# Patient Record
Sex: FEMALE | Race: WHITE | NOT HISPANIC OR LATINO | Employment: FULL TIME | URBAN - METROPOLITAN AREA
[De-identification: names, ages, dates, MRNs, and addresses within clinical notes are randomized per-mention and may not be internally consistent; named-entity substitution may affect disease eponyms.]

---

## 2017-01-12 ENCOUNTER — OFFICE VISIT (OUTPATIENT)
Dept: RETAIL CLINIC | Facility: CLINIC | Age: 22
End: 2017-01-12

## 2017-01-12 VITALS
TEMPERATURE: 98.4 F | HEART RATE: 98 BPM | RESPIRATION RATE: 18 BRPM | DIASTOLIC BLOOD PRESSURE: 90 MMHG | OXYGEN SATURATION: 98 % | SYSTOLIC BLOOD PRESSURE: 130 MMHG

## 2017-01-12 DIAGNOSIS — J40 BRONCHITIS: Primary | ICD-10-CM

## 2017-01-12 PROCEDURE — 99213 OFFICE O/P EST LOW 20 MIN: CPT | Performed by: NURSE PRACTITIONER

## 2017-01-12 RX ORDER — PREDNISONE 10 MG/1
TABLET ORAL
Qty: 21 TABLET | Refills: 0 | OUTPATIENT
Start: 2017-01-12 | End: 2021-12-18

## 2017-01-12 RX ORDER — ALBUTEROL SULFATE 90 UG/1
2 AEROSOL, METERED RESPIRATORY (INHALATION) EVERY 4 HOURS PRN
Qty: 1 INHALER | Refills: 0 | OUTPATIENT
Start: 2017-01-12 | End: 2021-12-18

## 2017-01-12 RX ORDER — BROMPHENIRAMINE MALEATE, PSEUDOEPHEDRINE HYDROCHLORIDE, AND DEXTROMETHORPHAN HYDROBROMIDE 2; 30; 10 MG/5ML; MG/5ML; MG/5ML
5 SYRUP ORAL 4 TIMES DAILY PRN
Qty: 250 ML | Refills: 0 | OUTPATIENT
Start: 2017-01-12 | End: 2021-12-18

## 2017-01-12 NOTE — PROGRESS NOTES
Subjective   Renea Infante is a 21 y.o. female.     Cough   This is a recurrent problem. The current episode started in the past 7 days. The problem has been gradually worsening. The cough is productive of sputum. Associated symptoms include headaches, nasal congestion and wheezing. Pertinent negatives include no fever, postnasal drip or sore throat. Associated symptoms comments: Chest congestion. Risk factors for lung disease include smoking/tobacco exposure. She has tried OTC cough suppressant for the symptoms. The treatment provided mild relief. Her past medical history is significant for bronchitis and environmental allergies.   URI    This is a recurrent problem. The current episode started in the past 7 days. There has been no fever. Associated symptoms include congestion, coughing, headaches and wheezing. Pertinent negatives include no sore throat. She has tried decongestant for the symptoms. The treatment provided mild relief.        The following portions of the patient's history were reviewed and updated as appropriate: allergies, current medications, past family history, past medical history, past social history, past surgical history and problem list.    Review of Systems   Constitutional: Negative for fever.   HENT: Positive for congestion and voice change. Negative for postnasal drip and sore throat.    Eyes: Negative.    Respiratory: Positive for cough, chest tightness and wheezing.    Cardiovascular: Negative.    Allergic/Immunologic: Positive for environmental allergies.   Neurological: Positive for headaches.       Objective   Physical Exam   Constitutional: She is oriented to person, place, and time.   HENT:   Head: Atraumatic.   Right Ear: Tympanic membrane and external ear normal.   Left Ear: Tympanic membrane and external ear normal.   Mouth/Throat: No oropharyngeal exudate, posterior oropharyngeal edema or posterior oropharyngeal erythema.   Cardiovascular: Normal rate and regular rhythm.     Pulmonary/Chest: Effort normal. She has no decreased breath sounds. She has wheezes in the right upper field and the left upper field. She has rhonchi in the right upper field, the right middle field, the left upper field and the left middle field. She has no rales.   Chest congestion   Abdominal: Soft. Bowel sounds are normal.   Neurological: She is oriented to person, place, and time.   Nursing note and vitals reviewed.      Assessment/Plan   Renea was seen today for cough, fatigue and uri.    Diagnoses and all orders for this visit:    Bronchitis  -     predniSONE (DELTASONE) 10 MG tablet; 10 mg pack with package instructions  -     brompheniramine-pseudoephedrine-DM 30-2-10 MG/5ML syrup; Take 5 mL by mouth 4 (Four) Times a Day As Needed for allergies.  -     albuterol (PROVENTIL HFA) 108 (90 BASE) MCG/ACT inhaler; Inhale 2 puffs Every 4 (Four) Hours As Needed for wheezing or shortness of air.    Talked to the patient about the diagnosis and educate the patient and advise to visit to PCP if the symptoms worsens

## 2017-01-12 NOTE — PATIENT INSTRUCTIONS
Acute Bronchitis  Bronchitis is inflammation of the airways that extend from the windpipe into the lungs (bronchi). The inflammation often causes mucus to develop. This leads to a cough, which is the most common symptom of bronchitis.   In acute bronchitis, the condition usually develops suddenly and goes away over time, usually in a couple weeks. Smoking, allergies, and asthma can make bronchitis worse. Repeated episodes of bronchitis may cause further lung problems.   CAUSES  Acute bronchitis is most often caused by the same virus that causes a cold. The virus can spread from person to person (contagious) through coughing, sneezing, and touching contaminated objects.  SIGNS AND SYMPTOMS   · Cough.    · Fever.    · Coughing up mucus.    · Body aches.    · Chest congestion.    · Chills.    · Shortness of breath.    · Sore throat.    DIAGNOSIS   Acute bronchitis is usually diagnosed through a physical exam. Your health care provider will also ask you questions about your medical history. Tests, such as chest X-rays, are sometimes done to rule out other conditions.   TREATMENT   Acute bronchitis usually goes away in a couple weeks. Oftentimes, no medical treatment is necessary. Medicines are sometimes given for relief of fever or cough. Antibiotic medicines are usually not needed but may be prescribed in certain situations. In some cases, an inhaler may be recommended to help reduce shortness of breath and control the cough. A cool mist vaporizer may also be used to help thin bronchial secretions and make it easier to clear the chest.   HOME CARE INSTRUCTIONS  · Get plenty of rest.    · Drink enough fluids to keep your urine clear or pale yellow (unless you have a medical condition that requires fluid restriction). Increasing fluids may help thin your respiratory secretions (sputum) and reduce chest congestion, and it will prevent dehydration.    · Take medicines only as directed by your health care provider.  · If  you were prescribed an antibiotic medicine, finish it all even if you start to feel better.  · Avoid smoking and secondhand smoke. Exposure to cigarette smoke or irritating chemicals will make bronchitis worse. If you are a smoker, consider using nicotine gum or skin patches to help control withdrawal symptoms. Quitting smoking will help your lungs heal faster.    · Reduce the chances of another bout of acute bronchitis by washing your hands frequently, avoiding people with cold symptoms, and trying not to touch your hands to your mouth, nose, or eyes.    · Keep all follow-up visits as directed by your health care provider.    SEEK MEDICAL CARE IF:  Your symptoms do not improve after 1 week of treatment.   SEEK IMMEDIATE MEDICAL CARE IF:  · You develop an increased fever or chills.    · You have chest pain.    · You have severe shortness of breath.  · You have bloody sputum.    · You develop dehydration.  · You faint or repeatedly feel like you are going to pass out.  · You develop repeated vomiting.  · You develop a severe headache.  MAKE SURE YOU:   · Understand these instructions.  · Will watch your condition.  · Will get help right away if you are not doing well or get worse.     This information is not intended to replace advice given to you by your health care provider. Make sure you discuss any questions you have with your health care provider.     Document Released: 01/25/2006 Document Revised: 01/08/2016 Document Reviewed: 06/10/2014  [employer] Interactive Patient Education ©2016 [employer]  Talked to the patient about the diagnosis and educate the patient and advise to visit to PCP if the symptoms worsens

## 2017-01-12 NOTE — MR AVS SNAPSHOT
Rneea Naresh   1/12/2017 6:30 PM   Office Visit    Dept Phone:  [phone]   Encounter #:  [id number]    Provider:  MILAGRO FAULKNER   Department:  [facility]                Your Full Care Plan              Today's Medication Changes          These changes are accurate as of: 1/12/17  6:46 PM.  If you have any questions, ask your nurse or doctor.               New Medication(s)Ordered:     albuterol 108 (90 BASE) MCG/ACT inhaler   Commonly known as:  PROVENTIL HFA   Inhale 2 puffs Every 4 (Four) Hours As Needed for wheezing or shortness of air.       brompheniramine-pseudoephedrine-DM 30-2-10 MG/5ML syrup   Take 5 mL by mouth 4 (Four) Times a Day As Needed for allergies.       predniSONE 10 MG tablet   Commonly known as:  DELTASONE   10 mg pack with package instructions            Where to Get Your Medications      These medications were sent to [facility] [address] [place] [address] [id number] AT [facility] [facility] - [phone]  - [phone]   [address], [facility] [address]    Hours:  24-hours Phone:  [phone]     albuterol 108 (90 BASE) MCG/ACT inhaler    brompheniramine-pseudoephedrine-DM 30-2-10 MG/5ML syrup    predniSONE 10 MG tablet                  Your Updated Medication List          This list is accurate as of: 1/12/17  6:46 PM.  Always use your most recent med list.                albuterol 108 (90 BASE) MCG/ACT inhaler   Commonly known as:  PROVENTIL HFA   Inhale 2 puffs Every 4 (Four) Hours As Needed for wheezing or shortness of air.       brompheniramine-pseudoephedrine-DM 30-2-10 MG/5ML syrup   Take 5 mL by mouth 4 (Four) Times a Day As Needed for allergies.       MUCINEX DM MAXIMUM STRENGTH PO       ondansetron 4 MG tablet   Commonly known as:  ZOFRAN   Take 1 tablet by mouth Every 6 (Six) Hours As Needed for nausea or vomiting.       predniSONE 10 MG tablet   Commonly known as:   DELTASONE   10 mg pack with package instructions               You Were Diagnosed With        Codes Comments    Bronchitis    -  Primary ICD-10-CM: J40  ICD-9-CM: 490       Instructions    Acute Bronchitis  Bronchitis is inflammation of the airways that extend from the windpipe into the lungs (bronchi). The inflammation often causes mucus to develop. This leads to a cough, which is the most common symptom of bronchitis.   In acute bronchitis, the condition usually develops suddenly and goes away over time, usually in a couple weeks. Smoking, allergies, and asthma can make bronchitis worse. Repeated episodes of bronchitis may cause further lung problems.   CAUSES  Acute bronchitis is most often caused by the same virus that causes a cold. The virus can spread from person to person (contagious) through coughing, sneezing, and touching contaminated objects.  SIGNS AND SYMPTOMS   · Cough.    · Fever.    · Coughing up mucus.    · Body aches.    · Chest congestion.    · Chills.    · Shortness of breath.    · Sore throat.    DIAGNOSIS   Acute bronchitis is usually diagnosed through a physical exam. Your health care provider will also ask you questions about your medical history. Tests, such as chest X-rays, are sometimes done to rule out other conditions.   TREATMENT   Acute bronchitis usually goes away in a couple weeks. Oftentimes, no medical treatment is necessary. Medicines are sometimes given for relief of fever or cough. Antibiotic medicines are usually not needed but may be prescribed in certain situations. In some cases, an inhaler may be recommended to help reduce shortness of breath and control the cough. A cool mist vaporizer may also be used to help thin bronchial secretions and make it easier to clear the chest.   HOME CARE INSTRUCTIONS  · Get plenty of rest.    · Drink enough fluids to keep your urine clear or pale yellow (unless you have a medical condition that requires fluid restriction). Increasing fluids  may help thin your respiratory secretions (sputum) and reduce chest congestion, and it will prevent dehydration.    · Take medicines only as directed by your health care provider.  · If you were prescribed an antibiotic medicine, finish it all even if you start to feel better.  · Avoid smoking and secondhand smoke. Exposure to cigarette smoke or irritating chemicals will make bronchitis worse. If you are a smoker, consider using nicotine gum or skin patches to help control withdrawal symptoms. Quitting smoking will help your lungs heal faster.    · Reduce the chances of another bout of acute bronchitis by washing your hands frequently, avoiding people with cold symptoms, and trying not to touch your hands to your mouth, nose, or eyes.    · Keep all follow-up visits as directed by your health care provider.    SEEK MEDICAL CARE IF:  Your symptoms do not improve after 1 week of treatment.   SEEK IMMEDIATE MEDICAL CARE IF:  · You develop an increased fever or chills.    · You have chest pain.    · You have severe shortness of breath.  · You have bloody sputum.    · You develop dehydration.  · You faint or repeatedly feel like you are going to pass out.  · You develop repeated vomiting.  · You develop a severe headache.  MAKE SURE YOU:   · Understand these instructions.  · Will watch your condition.  · Will get help right away if you are not doing well or get worse.     This information is not intended to replace advice given to you by your health care provider. Make sure you discuss any questions you have with your health care provider.     Document Released: 01/25/2006 Document Revised: 01/08/2016 Document Reviewed: 06/10/2014  [employer] Interactive Patient Education ©2016 [employer]       Patient Instructions History      Upcoming Appointments     Visit Type Date Time Department    OFFICE VISIT 1/12/2017  6:30 PM [facility]      [employer] Signup     [facility] [employer] allows you to send messages to your  doctor, view your test results, renew your prescriptions, schedule appointments, and more. To sign up, go to [email or web address] and click on the Sign Up Now link in the New User? box. Enter your Shake Activation Code exactly as it appears below along with the last four digits of your Social Security Number and your Date of Birth () to complete the sign-up process. If you do not sign up before the expiration date, you must request a new code.    Shake Activation Code: [email or web address]  Expires: 2017  6:45 PM    If you have questions, you can email [email or web address] or call [phone] to talk to our Shake staff. Remember, Shake is NOT to be used for urgent needs. For medical emergencies, dial 911.               Other Info from Your Visit           Allergies     Penicillins        Reason for Visit     Cough productive with green phlegm x 2 wks getting worse    Fatigue           Vital Signs     Blood Pressure Pulse Temperature Respirations Oxygen Saturation       130/90 98 98.4 °F (36.9 °C) (Oral) 18 98%     Smoking Status                   Former Smoker           Problems and Diagnoses Noted     Bronchitis    -  Primary

## 2017-01-13 PROBLEM — R09.89 CHEST CONGESTION: Status: ACTIVE | Noted: 2017-01-13

## 2017-01-13 PROBLEM — R05.9 COUGH: Status: ACTIVE | Noted: 2017-01-13

## 2017-06-27 ENCOUNTER — HOSPITAL ENCOUNTER (EMERGENCY)
Facility: HOSPITAL | Age: 22
Discharge: HOME | End: 2017-06-27
Attending: EMERGENCY MEDICINE | Admitting: EMERGENCY MEDICINE

## 2017-06-27 VITALS
HEIGHT: 69 IN | SYSTOLIC BLOOD PRESSURE: 135 MMHG | BODY MASS INDEX: 34.07 KG/M2 | HEART RATE: 96 BPM | DIASTOLIC BLOOD PRESSURE: 89 MMHG | WEIGHT: 230 LBS | RESPIRATION RATE: 16 BRPM | TEMPERATURE: 99.3 F | OXYGEN SATURATION: 97 %

## 2017-06-27 DIAGNOSIS — M54.41 ACUTE MIDLINE LOW BACK PAIN WITH BILATERAL SCIATICA: Primary | ICD-10-CM

## 2017-06-27 DIAGNOSIS — M54.42 ACUTE MIDLINE LOW BACK PAIN WITH BILATERAL SCIATICA: Primary | ICD-10-CM

## 2017-06-27 PROCEDURE — 99283 EMERGENCY DEPT VISIT LOW MDM: CPT

## 2017-06-27 PROCEDURE — 63710000001 PREDNISONE PER 1 MG: Performed by: PHYSICIAN ASSISTANT

## 2017-06-27 RX ORDER — DICLOFENAC SODIUM 75 MG/1
75 TABLET, DELAYED RELEASE ORAL 2 TIMES DAILY
Qty: 20 TABLET | Refills: 0 | OUTPATIENT
Start: 2017-06-27 | End: 2021-12-18

## 2017-06-27 RX ORDER — HYDROCODONE BITARTRATE AND ACETAMINOPHEN 7.5; 325 MG/1; MG/1
1 TABLET ORAL ONCE
Status: COMPLETED | OUTPATIENT
Start: 2017-06-27 | End: 2017-06-27

## 2017-06-27 RX ORDER — PREDNISONE 20 MG/1
60 TABLET ORAL ONCE
Status: COMPLETED | OUTPATIENT
Start: 2017-06-27 | End: 2017-06-27

## 2017-06-27 RX ORDER — METHOCARBAMOL 500 MG/1
1000 TABLET, FILM COATED ORAL 4 TIMES DAILY
Qty: 40 TABLET | Refills: 0 | OUTPATIENT
Start: 2017-06-27 | End: 2021-12-18

## 2017-06-27 RX ADMIN — HYDROCODONE BITARTRATE AND ACETAMINOPHEN 1 TABLET: 7.5; 325 TABLET ORAL at 21:08

## 2017-06-27 RX ADMIN — PREDNISONE 60 MG: 20 TABLET ORAL at 21:09

## 2018-04-03 ENCOUNTER — APPOINTMENT (OUTPATIENT)
Dept: CT IMAGING | Facility: HOSPITAL | Age: 23
End: 2018-04-03

## 2018-04-03 ENCOUNTER — HOSPITAL ENCOUNTER (EMERGENCY)
Facility: HOSPITAL | Age: 23
Discharge: HOME | End: 2018-04-03
Attending: FAMILY MEDICINE | Admitting: FAMILY MEDICINE

## 2018-04-03 VITALS
HEIGHT: 69 IN | SYSTOLIC BLOOD PRESSURE: 155 MMHG | HEART RATE: 100 BPM | RESPIRATION RATE: 20 BRPM | DIASTOLIC BLOOD PRESSURE: 88 MMHG | OXYGEN SATURATION: 86 % | BODY MASS INDEX: 37.03 KG/M2 | TEMPERATURE: 96.9 F | WEIGHT: 250 LBS

## 2018-04-03 DIAGNOSIS — G43.109 MIGRAINE WITH AURA AND WITHOUT STATUS MIGRAINOSUS, NOT INTRACTABLE: Primary | ICD-10-CM

## 2018-04-03 PROCEDURE — 96374 THER/PROPH/DIAG INJ IV PUSH: CPT

## 2018-04-03 PROCEDURE — 25010000002 PROCHLORPERAZINE EDISYLATE PER 10 MG: Performed by: FAMILY MEDICINE

## 2018-04-03 PROCEDURE — 70450 CT HEAD/BRAIN W/O DYE: CPT

## 2018-04-03 PROCEDURE — 99284 EMERGENCY DEPT VISIT MOD MDM: CPT

## 2018-04-03 RX ADMIN — SODIUM CHLORIDE 1000 ML: 9 INJECTION, SOLUTION INTRAVENOUS at 20:17

## 2018-04-03 RX ADMIN — PROCHLORPERAZINE EDISYLATE 10 MG: 5 INJECTION INTRAMUSCULAR; INTRAVENOUS at 20:17

## 2018-04-03 NOTE — ED NOTES
"Patient tearful upon entering room, stating, \"I think I'm having a stroke.\" Patient describes headache that started at 1145 today as a tingling pressure, radiating from occipital lobe to parietal area, sometimes on the left and others on the right. Patient is seeing black spots. Denies photophobia. Patient nauseated and is dry-heaving during assessment. Patient tachycardic and sweating. Patient on monitor, IV in place, blood drawn and tubes placed at bedside, emesis bag provided, call light in lap, patient awaiting MD.     Herminia Diggs RN  04/03/18 1917    "

## 2018-04-03 NOTE — ED TRIAGE NOTES
Pt c/o headache starting at 11am today. Pt states that she took ibuprofen and attempted to take a nap was unable to sleep. Pt states that she feels shaky, sees black speckles intermittently and feels sweaty and has a dry mouth. Pt states that pain starts at the back of her head and moves to the top.

## 2018-04-03 NOTE — ED PROVIDER NOTES
EMERGENCY DEPARTMENT ENCOUNTER    CHIEF COMPLAINT  Chief Complaint: HA  History given by: patient  History limited by: nothing  Room Number: 48/48  PMD: No Known Provider      HPI:  Pt is a 22 y.o. female who presents complaining of a headache onset 1145 today. The pain was sudden in the back of her head followed by diaphoresis and nausea. She began to feel weak and dizzy, then the pain radiated anteriorly. She reports her throat dried up as well as peripheral flashing lights in her vision. She denies vomiting, diarrhea, and bowel issues. She took ASA and fell asleep which slightly improved her sx, but when she stood up her sx worsened again. She has a hx of migraines and this is not the worst of them, but she feels this HA is different. Her usual migraines may be in the same distribution and with scintillating scotomata like she had today but she is not photophobic today like she usually is.  She has a hx of ocular floaters and she also had some today.  Presently her vision is fine..    Duration:  today  Onset: sudden  Timing: constant  Location: back of head  Radiation: anterior head  Quality: sharp  Intensity/Severity: moderate  Progression: worsened  Associated Symptoms: nausea, weakness, dizziness, dry throat, diaphoress  Aggravating Factors: none  Alleviating Factors: ASA (slightly)  Previous Episodes: She has a hx of migraines, but this feels different.  Treatment before arrival: She took ASA prior to arrival.    PAST MEDICAL HISTORY  Active Ambulatory Problems     Diagnosis Date Noted   • Cough 2017   • Chest congestion 2017     Resolved Ambulatory Problems     Diagnosis Date Noted   • No Resolved Ambulatory Problems     Past Medical History:   Diagnosis Date   • Hypoglycemia    • Spina bifida occulta        PAST SURGICAL HISTORY  Past Surgical History:   Procedure Laterality Date   •  SECTION     • CYST REMOVAL     • RHINOPLASTY         FAMILY HISTORY  Family History   Problem Relation  Age of Onset   • Heart disease Maternal Grandmother    • Cancer Paternal Grandmother    • Diabetes Paternal Grandmother        SOCIAL HISTORY  Social History     Social History   • Marital status: Single     Spouse name: N/A   • Number of children: N/A   • Years of education: N/A     Occupational History   • Not on file.     Social History Main Topics   • Smoking status: Former Smoker   • Smokeless tobacco: Former User     Quit date: 2015   • Alcohol use Yes      Comment: rarely   • Drug use: No   • Sexual activity: Not on file     Other Topics Concern   • Not on file     Social History Narrative   • No narrative on file       ALLERGIES  Penicillins    REVIEW OF SYSTEMS  Review of Systems   Constitutional: Positive for diaphoresis. Negative for fever.   HENT: Negative for sore throat.         Dry throat   Eyes: Negative.    Respiratory: Negative for cough and shortness of breath.    Cardiovascular: Negative for chest pain.   Gastrointestinal: Positive for nausea. Negative for abdominal pain, diarrhea and vomiting.   Genitourinary: Negative for dysuria.   Musculoskeletal: Negative for neck pain.   Skin: Negative for rash.   Allergic/Immunologic: Negative.    Neurological: Positive for dizziness, weakness and headaches. Negative for numbness.   Hematological: Negative.    Psychiatric/Behavioral: Negative.    All other systems reviewed and are negative.      PHYSICAL EXAM  ED Triage Vitals   Temp Heart Rate Resp BP SpO2   04/03/18 1842 04/03/18 1842 04/03/18 1842 04/03/18 1854 04/03/18 1842   96.9 °F (36.1 °C) (!) 138 20 (!) 182/125 99 %      Temp src Heart Rate Source Patient Position BP Location FiO2 (%)   04/03/18 1842 04/03/18 1842 04/03/18 1854 04/03/18 1854 --   Tympanic Monitor Sitting Right arm        Physical Exam   Constitutional: She is oriented to person, place, and time and well-developed, well-nourished, and in no distress. No distress.   HENT:   Head: Normocephalic and atraumatic.   Eyes: EOM are normal.  Pupils are equal, round, and reactive to light.   Normal visual acuity   Neck: Normal range of motion. Neck supple.   Cardiovascular: Normal rate, regular rhythm and normal heart sounds.    Pulmonary/Chest: Effort normal and breath sounds normal. No respiratory distress.   Abdominal: Soft. There is no tenderness. There is no rebound and no guarding.   Musculoskeletal: Normal range of motion. She exhibits no edema.   Neurological: She is alert and oriented to person, place, and time. She has normal sensation and normal strength.   Skin: Skin is warm and dry. No rash noted.   Psychiatric: Mood and affect normal.   Nursing note and vitals reviewed.      RADIOLOGY  CT Head Without Contrast   Preliminary Result   No acute process identified. Further evaluation could be   performed with a MRI examination of brain as indicated.               Radiation dose reduction techniques were utilized, including automated   exposure control and exposure modulation based on body size.                   I ordered the above noted radiological studies. Interpreted by radiologist. Reviewed by me in PACS.       PROCEDURES  Procedures  Interval: baseline  1a. Level of Consciousness: 0-->Alert, keenly responsive  1b. LOC Questions: 0-->Answers both questions correctly  1c. LOC Commands: 0-->Performs both tasks correctly  2. Best Gaze: 0-->Normal  3. Visual: 0-->No visual loss  4. Facial Palsy: 0-->Normal symmetrical movements  5a. Motor Arm, Left: 0-->No drift, limb holds 90 (or 45) degrees for full 10 secs  5b. Motor Arm, Right: 0-->No drift, limb holds 90 (or 45) degrees for full 10 secs  6a. Motor Leg, Left: 0-->No drift, leg holds 30 degree position for full 5 secs  6b. Motor Leg, Right: 0-->No drift, leg holds 30 degree position for full 5 secs  7. Limb Ataxia: 0-->Absent  8. Sensory: 0-->Normal, no sensory loss  9. Best Language: 0-->No aphasia, normal  10. Dysarthria: 0-->Normal  11. Extinction and Inattention (formerly Neglect):  0-->No abnormality    Total (NIH Stroke Scale): 0      PROGRESS AND CONSULTS  ED Course     2005- Initial pt evaluation. We will plan for CT Head to further evaluate the pt. Will f/u with the results.    2115- Pt recheck. Pt is feeling better after IV compazine. I assured her that her CT is likely negative, but we are still waiting for results. Pt will likely be discharged if no acute abnormalities are found. Pt agrees with the plan for discharge.    MEDICAL DECISION MAKING  Results were reviewed/discussed with the patient and they were also made aware of online access. Pt also made aware that some labs, such as cultures, will not be resulted during ER visit and follow up with PMD is necessary.     MDM  Number of Diagnoses or Management Options     Amount and/or Complexity of Data Reviewed  Tests in the radiology section of CPT®: ordered and reviewed           DIAGNOSIS  Final diagnoses:   Migraine with aura and without status migrainosus, not intractable       DISPOSITION  DISCHARGE    Patient discharged in stable condition.    Reviewed implications of results, diagnosis, meds, responsibility to follow up, warning signs and symptoms of possible worsening, potential complications and reasons to return to ER, including worsening of migraine.    Patient/Family voiced understanding of above instructions.    Discussed plan for discharge, as there is no emergent indication for admission. Patient referred to primary care provider for BP management due to today's BP. Pt/family is agreeable and understands need for follow up and repeat testing.  Pt is aware that discharge does not mean that nothing is wrong but it indicates no emergency is present that requires admission and they must continue care with follow-up as given below or physician of their choice.     FOLLOW-UP  Marlon Vazquez V DO  [address]  [facility] [address] [phone]               Medication List      No changes were made to your prescriptions  during this visit.           Latest Documented Vital Signs:  As of 9:24 PM  BP- 155/88 HR- 87 Temp- 96.9 °F (36.1 °C) (Tympanic) O2 sat- 97%    --  Documentation assistance provided by shahnaz Singletary for Dr. Batista.  Information recorded by the scribaram was done at my direction and has been verified and validated by me.     Adeel Singletary  04/03/18 2028       Adeel Singletary  04/03/18 2114       Adeel Singletary  04/03/18 2124       Anders Batista MD  04/03/18 6405

## 2018-04-03 NOTE — ED NOTES
Pt c/o headache with tingling in her head since 1145 this am. Pt also c/o dizziness, weakness, dry mouth, and sweating.      Jina Lew RN  04/03/18 2873

## 2018-04-04 NOTE — ED NOTES
Visual acuity test complete.  Left eye: 20/25  Right eye: 20/25  Both eyes: 20/15    Reported to MD.     Herminia Diggs RN  04/03/18 [id number]

## 2020-09-04 ENCOUNTER — APPOINTMENT (OUTPATIENT)
Dept: CT IMAGING | Facility: HOSPITAL | Age: 25
End: 2020-09-04

## 2020-09-04 ENCOUNTER — HOSPITAL ENCOUNTER (EMERGENCY)
Facility: HOSPITAL | Age: 25
Discharge: HOME | End: 2020-09-05
Attending: EMERGENCY MEDICINE | Admitting: EMERGENCY MEDICINE

## 2020-09-04 DIAGNOSIS — G43.109 MIGRAINE WITH AURA AND WITHOUT STATUS MIGRAINOSUS, NOT INTRACTABLE: Primary | ICD-10-CM

## 2020-09-04 LAB
ALBUMIN SERPL-MCNC: 4.6 G/DL (ref 3.5–5.2)
ALBUMIN/GLOB SERPL: 1.4 G/DL
ALP SERPL-CCNC: 71 U/L (ref 39–117)
ALT SERPL W P-5'-P-CCNC: 32 U/L (ref 1–33)
ANION GAP SERPL CALCULATED.3IONS-SCNC: 12.4 MMOL/L (ref 5–15)
AST SERPL-CCNC: 20 U/L (ref 1–32)
BASOPHILS # BLD AUTO: 0.06 10*3/MM3 (ref 0–0.2)
BASOPHILS NFR BLD AUTO: 0.4 % (ref 0–1.5)
BILIRUB SERPL-MCNC: 0.3 MG/DL (ref 0–1.2)
BUN SERPL-MCNC: 7 MG/DL (ref 6–20)
BUN/CREAT SERPL: 10.3 (ref 7–25)
CALCIUM SPEC-SCNC: 9.8 MG/DL (ref 8.6–10.5)
CHLORIDE SERPL-SCNC: 104 MMOL/L (ref 98–107)
CO2 SERPL-SCNC: 23.6 MMOL/L (ref 22–29)
CREAT SERPL-MCNC: 0.68 MG/DL (ref 0.57–1)
DEPRECATED RDW RBC AUTO: 38.3 FL (ref 37–54)
EOSINOPHIL # BLD AUTO: 0.2 10*3/MM3 (ref 0–0.4)
EOSINOPHIL NFR BLD AUTO: 1.5 % (ref 0.3–6.2)
ERYTHROCYTE [DISTWIDTH] IN BLOOD BY AUTOMATED COUNT: 12.6 % (ref 12.3–15.4)
GFR SERPL CREATININE-BSD FRML MDRD: 104 ML/MIN/1.73
GLOBULIN UR ELPH-MCNC: 3.4 GM/DL
GLUCOSE SERPL-MCNC: 107 MG/DL (ref 65–99)
HCT VFR BLD AUTO: 42.1 % (ref 34–46.6)
HGB BLD-MCNC: 14.4 G/DL (ref 12–15.9)
IMM GRANULOCYTES # BLD AUTO: 0.1 10*3/MM3 (ref 0–0.05)
IMM GRANULOCYTES NFR BLD AUTO: 0.7 % (ref 0–0.5)
LYMPHOCYTES # BLD AUTO: 3.37 10*3/MM3 (ref 0.7–3.1)
LYMPHOCYTES NFR BLD AUTO: 24.7 % (ref 19.6–45.3)
MCH RBC QN AUTO: 28.9 PG (ref 26.6–33)
MCHC RBC AUTO-ENTMCNC: 34.2 G/DL (ref 31.5–35.7)
MCV RBC AUTO: 84.4 FL (ref 79–97)
MONOCYTES # BLD AUTO: 1.04 10*3/MM3 (ref 0.1–0.9)
MONOCYTES NFR BLD AUTO: 7.6 % (ref 5–12)
NEUTROPHILS NFR BLD AUTO: 65.1 % (ref 42.7–76)
NEUTROPHILS NFR BLD AUTO: 8.86 10*3/MM3 (ref 1.7–7)
NRBC BLD AUTO-RTO: 0 /100 WBC (ref 0–0.2)
PLATELET # BLD AUTO: 394 10*3/MM3 (ref 140–450)
PMV BLD AUTO: 8.8 FL (ref 6–12)
POTASSIUM SERPL-SCNC: 3.4 MMOL/L (ref 3.5–5.2)
PROT SERPL-MCNC: 8 G/DL (ref 6–8.5)
RBC # BLD AUTO: 4.99 10*6/MM3 (ref 3.77–5.28)
SODIUM SERPL-SCNC: 140 MMOL/L (ref 136–145)
WBC # BLD AUTO: 13.63 10*3/MM3 (ref 3.4–10.8)

## 2020-09-04 PROCEDURE — 25010000002 ONDANSETRON PER 1 MG: Performed by: EMERGENCY MEDICINE

## 2020-09-04 PROCEDURE — 80053 COMPREHEN METABOLIC PANEL: CPT | Performed by: EMERGENCY MEDICINE

## 2020-09-04 PROCEDURE — 99284 EMERGENCY DEPT VISIT MOD MDM: CPT

## 2020-09-04 PROCEDURE — 96375 TX/PRO/DX INJ NEW DRUG ADDON: CPT

## 2020-09-04 PROCEDURE — 85025 COMPLETE CBC W/AUTO DIFF WBC: CPT | Performed by: EMERGENCY MEDICINE

## 2020-09-04 PROCEDURE — 96374 THER/PROPH/DIAG INJ IV PUSH: CPT

## 2020-09-04 PROCEDURE — 25010000002 PROCHLORPERAZINE 10 MG/2ML SOLUTION: Performed by: EMERGENCY MEDICINE

## 2020-09-04 PROCEDURE — 70450 CT HEAD/BRAIN W/O DYE: CPT

## 2020-09-04 PROCEDURE — 25010000002 HYDROMORPHONE PER 4 MG: Performed by: EMERGENCY MEDICINE

## 2020-09-04 RX ORDER — PROCHLORPERAZINE EDISYLATE 5 MG/ML
5 INJECTION INTRAMUSCULAR; INTRAVENOUS EVERY 6 HOURS PRN
Status: DISCONTINUED | OUTPATIENT
Start: 2020-09-04 | End: 2020-09-05 | Stop reason: HOSPADM

## 2020-09-04 RX ORDER — SODIUM CHLORIDE 0.9 % (FLUSH) 0.9 %
10 SYRINGE (ML) INJECTION AS NEEDED
Status: DISCONTINUED | OUTPATIENT
Start: 2020-09-04 | End: 2020-09-05 | Stop reason: HOSPADM

## 2020-09-04 RX ORDER — HYDROMORPHONE HYDROCHLORIDE 1 MG/ML
0.5 INJECTION, SOLUTION INTRAMUSCULAR; INTRAVENOUS; SUBCUTANEOUS ONCE
Status: COMPLETED | OUTPATIENT
Start: 2020-09-04 | End: 2020-09-04

## 2020-09-04 RX ORDER — ONDANSETRON 2 MG/ML
4 INJECTION INTRAMUSCULAR; INTRAVENOUS ONCE
Status: COMPLETED | OUTPATIENT
Start: 2020-09-04 | End: 2020-09-04

## 2020-09-04 RX ADMIN — ONDANSETRON 4 MG: 2 INJECTION INTRAMUSCULAR; INTRAVENOUS at 22:55

## 2020-09-04 RX ADMIN — HYDROMORPHONE HYDROCHLORIDE 0.5 MG: 1 INJECTION, SOLUTION INTRAMUSCULAR; INTRAVENOUS; SUBCUTANEOUS at 22:55

## 2020-09-04 RX ADMIN — PROCHLORPERAZINE EDISYLATE 5 MG: 5 INJECTION INTRAMUSCULAR; INTRAVENOUS at 22:55

## 2020-09-05 VITALS
OXYGEN SATURATION: 97 % | RESPIRATION RATE: 16 BRPM | TEMPERATURE: 99.6 F | HEART RATE: 60 BPM | BODY MASS INDEX: 38.09 KG/M2 | HEIGHT: 69 IN | DIASTOLIC BLOOD PRESSURE: 82 MMHG | SYSTOLIC BLOOD PRESSURE: 131 MMHG | WEIGHT: 257.2 LBS

## 2020-09-05 RX ORDER — SUMATRIPTAN 50 MG/1
50 TABLET, FILM COATED ORAL
Qty: 9 TABLET | Refills: 0 | OUTPATIENT
Start: 2020-09-05 | End: 2021-12-18

## 2020-09-05 NOTE — ED NOTES
"2 hours ago pt started seeing a light in right eye. Pt took a nap thinking that would help, upon waking up she saw light in both eyes. Pt reports having difficulty \"finding words\". Pt reports left arm \"feels heavy\" and \"feels hot\". Pt c/o left sided \"pressure\" in head. Hx of migraines.         Gracie Sargent, RN  09/04/20 2086    "

## 2020-09-05 NOTE — ED NOTES
Pt informed this RN that she was planning on driving herself home. Pt told that because she received IV dilaudid, per policy she would need to wait before she is cleared to be discharged and drive home.  Pt verbalizes understanding, CN notified.      Gracie Sargent, HALLIE  09/05/20 0037

## 2020-09-05 NOTE — ED PROVIDER NOTES
EMERGENCY DEPARTMENT ENCOUNTER    Room Number:    Date of encounter:  2020  PCP: Savannah Stuart APRN  Historian: Patient      HPI:  Chief Complaint: Headache  A complete HPI/ROS/PMH/PSH/SH/FH are unobtainable due to: Nothing    Context: Renea Infante is a 25 y.o. female who presents to the ED c/o severe left-sided, throbbing headache that began earlier this evening.  Patient said that couple hours prior to arrival she had a very vivid aura in her right eye with lightening bolt streaks in her vision.  This lasted for about an hour, she said the peripheral vision was diminished on that side, but she did not have a headache until about an hour prior to arrival.  She said it began gradually and is built now up to a moderate severity.  Patient also states that she felt heaviness in her left arm during this time.    Patient says she has a history of migraines, but this is a very different aura than what she is used to.  She is had no fever, or other illness prior to today.  She does have nausea but no vomiting at this time.  She has no neck pain      PAST MEDICAL HISTORY  Active Ambulatory Problems     Diagnosis Date Noted   • Cough 2017   • Chest congestion 2017     Resolved Ambulatory Problems     Diagnosis Date Noted   • No Resolved Ambulatory Problems     Past Medical History:   Diagnosis Date   • Hypoglycemia    • Spina bifida occulta          PAST SURGICAL HISTORY  Past Surgical History:   Procedure Laterality Date   •  SECTION     • CYST REMOVAL     • RHINOPLASTY           FAMILY HISTORY  Family History   Problem Relation Age of Onset   • Heart disease Maternal Grandmother    • Cancer Paternal Grandmother    • Diabetes Paternal Grandmother          SOCIAL HISTORY  Social History     Socioeconomic History   • Marital status: Single     Spouse name: Not on file   • Number of children: Not on file   • Years of education: Not on file   • Highest education level: Not on file    Tobacco Use   • Smoking status: Former Smoker   • Smokeless tobacco: Former User     Quit date: 2015   Substance and Sexual Activity   • Alcohol use: Yes     Comment: rarely   • Drug use: No         ALLERGIES  Penicillins        REVIEW OF SYSTEMS  Review of Systems     All systems reviewed and negative except for those discussed in HPI.       PHYSICAL EXAM    I have reviewed the triage vital signs and nursing notes.    ED Triage Vitals   Temp Heart Rate Resp BP SpO2   09/04/20 2242 09/04/20 2235 09/04/20 2235 09/04/20 2237 09/04/20 2235   99.6 °F (37.6 °C) 110 20 (!) 169/110 98 %      Temp src Heart Rate Source Patient Position BP Location FiO2 (%)   09/04/20 2242 -- -- -- --   Oral           Physical Exam  GENERAL: Awake and alert, appears uncomfortable.  Photophobic  HENT: nares patent, no meningismus  EYES: no scleral icterus, Ana, EOMI  CV: Sinus tachycardia  RESPIRATORY: normal effort  ABDOMEN: soft  MUSCULOSKELETAL: no deformity  NEURO: alert, moves all extremities, follows commands, cranial nerves II through XII grossly intact, NIH 0  SKIN: warm, dry        LAB RESULTS  Recent Results (from the past 24 hour(s))   Comprehensive Metabolic Panel    Collection Time: 09/04/20 10:52 PM   Result Value Ref Range    Glucose 107 (H) 65 - 99 mg/dL    BUN 7 6 - 20 mg/dL    Creatinine 0.68 0.57 - 1.00 mg/dL    Sodium 140 136 - 145 mmol/L    Potassium 3.4 (L) 3.5 - 5.2 mmol/L    Chloride 104 98 - 107 mmol/L    CO2 23.6 22.0 - 29.0 mmol/L    Calcium 9.8 8.6 - 10.5 mg/dL    Total Protein 8.0 6.0 - 8.5 g/dL    Albumin 4.60 3.50 - 5.20 g/dL    ALT (SGPT) 32 1 - 33 U/L    AST (SGOT) 20 1 - 32 U/L    Alkaline Phosphatase 71 39 - 117 U/L    Total Bilirubin 0.3 0.0 - 1.2 mg/dL    eGFR Non African Amer 104 >60 mL/min/1.73    Globulin 3.4 gm/dL    A/G Ratio 1.4 g/dL    BUN/Creatinine Ratio 10.3 7.0 - 25.0    Anion Gap 12.4 5.0 - 15.0 mmol/L   CBC Auto Differential    Collection Time: 09/04/20 10:52 PM   Result Value Ref Range     WBC 13.63 (H) 3.40 - 10.80 10*3/mm3    RBC 4.99 3.77 - 5.28 10*6/mm3    Hemoglobin 14.4 12.0 - 15.9 g/dL    Hematocrit 42.1 34.0 - 46.6 %    MCV 84.4 79.0 - 97.0 fL    MCH 28.9 26.6 - 33.0 pg    MCHC 34.2 31.5 - 35.7 g/dL    RDW 12.6 12.3 - 15.4 %    RDW-SD 38.3 37.0 - 54.0 fl    MPV 8.8 6.0 - 12.0 fL    Platelets 394 140 - 450 10*3/mm3    Neutrophil % 65.1 42.7 - 76.0 %    Lymphocyte % 24.7 19.6 - 45.3 %    Monocyte % 7.6 5.0 - 12.0 %    Eosinophil % 1.5 0.3 - 6.2 %    Basophil % 0.4 0.0 - 1.5 %    Immature Grans % 0.7 (H) 0.0 - 0.5 %    Neutrophils, Absolute 8.86 (H) 1.70 - 7.00 10*3/mm3    Lymphocytes, Absolute 3.37 (H) 0.70 - 3.10 10*3/mm3    Monocytes, Absolute 1.04 (H) 0.10 - 0.90 10*3/mm3    Eosinophils, Absolute 0.20 0.00 - 0.40 10*3/mm3    Basophils, Absolute 0.06 0.00 - 0.20 10*3/mm3    Immature Grans, Absolute 0.10 (H) 0.00 - 0.05 10*3/mm3    nRBC 0.0 0.0 - 0.2 /100 WBC       Ordered the above labs and independently reviewed the results.        RADIOLOGY  Ct Head Without Contrast    Result Date: 9/5/2020  CT HEAD WITHOUT CONTRAST  HISTORY: Light in the right thigh starting 2 hours ago. Difficulty finding words.  COMPARISON: 04/03/2018  TECHNIQUE: Axial CT imaging was obtained through the brain. No IV contrast was administered.  FINDINGS: No acute intracranial hemorrhage is seen. Ventricles are normal in size. There is no midline shift or mass effect. Mild mucosal thickening is seen within the maxillary sinuses.      No acute intracranial findings.  Radiation dose reduction techniques were utilized, including automated exposure control and exposure modulation based on body size.  This report was finalized on 9/5/2020 12:35 AM by Dr. Deirdre Shin M.D.        I ordered the above noted radiological studies. Reviewed by me and discussed with radiologist.  See dictation for official radiology interpretation.      PROCEDURES    Procedures      MEDICATIONS GIVEN IN ER    Medications   HYDROmorphone  (DILAUDID) injection 0.5 mg (0.5 mg Intravenous Given 9/4/20 2255)   ondansetron (ZOFRAN) injection 4 mg (4 mg Intravenous Given 9/4/20 2255)         PROGRESS, DATA ANALYSIS, CONSULTS, AND MEDICAL DECISION MAKING    All labs have been independently reviewed by me.  All radiology studies have been reviewed by me and discussed with radiologist dictating the report.   EKG's independently viewed and interpreted by me.  Discussion below represents my analysis of pertinent findings related to patient's condition, differential diagnosis, treatment plan and final disposition.        ED Course as of Sep 05 0505   Sat Sep 05, 2020   0504 Patient received a migraine cocktail with Dilaudid, Zofran and Compazine.    [DP]   0504 CBC showed a mild leukocytosis, chemistry unremarkable    [DP]   0504 CT scan of the head show no acute disease    [DP]   0504 About an hour later, she was resting comfortably.  She said the pain was almost completely gone, all her visual complaints were completely resolved and she felt no more heaviness in the left arm.  Her exam is completely normalized, and her blood pressure came down on its own with just migraine treatment.    [DP]   0505 This sounds like pretty typical migraine with aura along with some neuro complaints    [DP]      ED Course User Index  [DP] Mitchell Thibodeaux MD           PPE: Both the patient and I wore a surgical mask throughout the entire patient encounter. I wore protective goggles.     AS OF 05:05 VITALS:    BP - 131/82  HR - 60  TEMP - 99.6 °F (37.6 °C) (Oral)  O2 SATS - 97%        DIAGNOSIS  Final diagnoses:   Migraine with aura and without status migrainosus, not intractable         DISPOSITION  Discharge           Mitchell Thibodeaux MD  09/05/20 0509

## 2021-08-24 ENCOUNTER — IMMUNIZATION (OUTPATIENT)
Dept: VACCINE CLINIC | Facility: HOSPITAL | Age: 26
End: 2021-08-24

## 2021-08-24 PROCEDURE — 0001A: CPT | Performed by: INTERNAL MEDICINE

## 2021-08-24 PROCEDURE — 91300 HC SARSCOV02 VAC 30MCG/0.3ML IM: CPT | Performed by: INTERNAL MEDICINE

## 2021-10-29 ENCOUNTER — IMMUNIZATION (OUTPATIENT)
Dept: VACCINE CLINIC | Facility: HOSPITAL | Age: 26
End: 2021-10-29

## 2021-10-29 PROCEDURE — 0002A: CPT | Performed by: INTERNAL MEDICINE

## 2021-10-29 PROCEDURE — 91300 HC SARSCOV02 VAC 30MCG/0.3ML IM: CPT | Performed by: INTERNAL MEDICINE

## 2021-12-18 ENCOUNTER — HOSPITAL ENCOUNTER (EMERGENCY)
Facility: HOSPITAL | Age: 26
Discharge: HOME | End: 2021-12-18
Attending: EMERGENCY MEDICINE | Admitting: EMERGENCY MEDICINE

## 2021-12-18 VITALS
TEMPERATURE: 98.9 F | RESPIRATION RATE: 16 BRPM | SYSTOLIC BLOOD PRESSURE: 168 MMHG | WEIGHT: 260 LBS | DIASTOLIC BLOOD PRESSURE: 108 MMHG | HEIGHT: 69 IN | HEART RATE: 88 BPM | BODY MASS INDEX: 38.51 KG/M2 | OXYGEN SATURATION: 98 %

## 2021-12-18 DIAGNOSIS — H66.003 NON-RECURRENT ACUTE SUPPURATIVE OTITIS MEDIA OF BOTH EARS WITHOUT SPONTANEOUS RUPTURE OF TYMPANIC MEMBRANES: Primary | ICD-10-CM

## 2021-12-18 DIAGNOSIS — J02.9 PHARYNGITIS, UNSPECIFIED ETIOLOGY: ICD-10-CM

## 2021-12-18 PROCEDURE — 0202U NFCT DS 22 TRGT SARS-COV-2: CPT | Performed by: PHYSICIAN ASSISTANT

## 2021-12-18 PROCEDURE — 99283 EMERGENCY DEPT VISIT LOW MDM: CPT

## 2021-12-18 RX ORDER — AZITHROMYCIN 250 MG/1
TABLET, FILM COATED ORAL
Qty: 6 TABLET | Refills: 0 | Status: SHIPPED | OUTPATIENT
Start: 2021-12-18 | End: 2022-10-11

## 2021-12-18 RX ORDER — PREDNISONE 20 MG/1
20 TABLET ORAL 2 TIMES DAILY
Qty: 10 TABLET | Refills: 0 | Status: SHIPPED | OUTPATIENT
Start: 2021-12-18 | End: 2022-10-11

## 2021-12-18 NOTE — ED NOTES
Pt ambulatory to triage from home with c/o bilateral ear pain (3 days) and sore throat x 2 weeks.  Pt positive for covid on 12/5/2021.  Pt wearing mask in triage.  Triage personnel wore appropriate PPE       Maria Zuniga RN  12/18/21 5783

## 2021-12-18 NOTE — ED PROVIDER NOTES
EMERGENCY DEPARTMENT ENCOUNTER    Room Number:    Date of encounter:  2021  PCP: Savannah Stuart APRN  Historian: Patient      I used full protective equipment while examining this patient.  This includes face mask, gloves and protective eyewear.  I washed my hands before entering the room and immediately upon leaving the room      HPI:  Chief Complaint: URI symptoms  A complete HPI/ROS/PMH/PSH/SH/FH are unobtainable due to: Nothing    Context: Renea Infante is a 26 y.o. female who presents to the ED c/o URI symptoms.  Patient states she was diagnosed with Covid in October.  Her symptoms from this gradually improved.  Over the past 2 weeks patient has had a sore throat.  Over the past 3 days she describes bilateral ear pain pressure.  She denies any fevers or chills.  She states her sore throat is worse with swallowing.  Patient reports being seen at [facility] 2 weeks ago.  She states her Covid test at that time was still positive.  She denies any cough or shortness of breath.    Review of Medical Records  I reviewed urgent care office visit from 2021.  Patient had a positive COVID-19 test at that time.    PAST MEDICAL HISTORY  Active Ambulatory Problems     Diagnosis Date Noted   • Cough 2017   • Chest congestion 2017     Resolved Ambulatory Problems     Diagnosis Date Noted   • No Resolved Ambulatory Problems     Past Medical History:   Diagnosis Date   • Hypoglycemia    • Spina bifida occulta          PAST SURGICAL HISTORY  Past Surgical History:   Procedure Laterality Date   •  SECTION     • CYST REMOVAL     • RHINOPLASTY           FAMILY HISTORY  Family History   Problem Relation Age of Onset   • Heart disease Maternal Grandmother    • Cancer Paternal Grandmother    • Diabetes Paternal Grandmother          SOCIAL HISTORY  Social History     Socioeconomic History   • Marital status: Single   Tobacco Use   • Smoking status: Former Smoker   • Smokeless tobacco:  Former User     Quit date: 2015   Substance and Sexual Activity   • Alcohol use: Yes     Comment: rarely   • Drug use: No         ALLERGIES  Penicillins        REVIEW OF SYSTEMS  All systems reviewed and negative except for those discussed in HPI.       PHYSICAL EXAM    I have reviewed the triage vital signs and nursing notes.    ED Triage Vitals   Temp Heart Rate Resp BP SpO2   12/18/21 0528 12/18/21 0528 12/18/21 0528 12/18/21 0541 12/18/21 0528   98.9 °F (37.2 °C) 111 16 (!) 171/115 99 %      Temp src Heart Rate Source Patient Position BP Location FiO2 (%)   12/18/21 0528 12/18/21 0528 12/18/21 0541 12/18/21 0541 --   Oral Monitor Lying Right arm        Physical Exam  GENERAL: Alert, oriented, not distressed  HENT: Mildly erythematous oropharynx.  Small pustule to right soft palate.  No peritonsillar abscesses.  Tympanic membranes clear and pearly bilaterally.  EYES: no scleral icterus, EOMI  CV: regular rhythm, regular rate, no murmur  RESPIRATORY: normal effort, CTA  ABDOMEN: soft, nontender  MUSCULOSKELETAL: no deformity, FROM, no calf swelling or tenderness  NEURO: alert, moves all extremities, follows commands  SKIN: warm, dry        LAB RESULTS  No results found for this or any previous visit (from the past 24 hour(s)).    Ordered the above labs and independently reviewed the results.      PROGRESS, DATA ANALYSIS, CONSULTS, AND MEDICAL DECISION MAKING    All labs have been independently reviewed by me.  All radiology studies have been reviewed by me and discussed with radiologist dictating the report.   EKG's independently viewed and interpreted by me.  Discussion below represents my analysis of pertinent findings related to patient's condition, differential diagnosis, treatment plan and final disposition.    I have discussed case with Dr. Goldstein, emergency room physician.  He has performed his own bedside examination and agrees with treatment plan.    ED Course as of 12/18/21 0720   Sat Dec 18, 2021    0629 Patient complains of URI symptoms x2 weeks.  Differential diagnosis include but not limited to viral URI, bacterial sinusitis, pneumonia, otitis media. [EE]      ED Course User Index  [EE] Andrzej Gutierrez PA       AS OF 07:20 EST VITALS:    BP - (!) 168/108  HR - 88  TEMP - 98.9 °F (37.2 °C) (Oral)  O2 SATS - 98%        DIAGNOSIS  Final diagnoses:   Non-recurrent acute suppurative otitis media of both ears without spontaneous rupture of tympanic membranes   Pharyngitis, unspecified etiology         DISPOSITION  Discharged         EMR Dragon/Transcription disclaimer:   Much of this encounter note is an electronic transcription/translation of spoken language to printed text. The electronic translation of spoken language may permit erroneous, or at times, nonsensical words or phrases to be inadvertently transcribed; Although I have reviewed the note for such errors, some may still exist.      Andrzej Gutierrez PA  12/18/21 0728

## 2021-12-18 NOTE — ED NOTES
Patient was placed in face mask in first look. Patient was wearing facemask when I entered the room and throughout our encounter. I wore full protective equipment throughout this patient encounter including a face mask, and gloves. Hand hygiene was performed before donning protective equipment and after removal when leaving the room.     Maria Luz Gan, RN  12/18/21 0511

## 2022-10-11 ENCOUNTER — APPOINTMENT (OUTPATIENT)
Dept: GENERAL RADIOLOGY | Facility: HOSPITAL | Age: 27
End: 2022-10-11

## 2022-10-11 ENCOUNTER — APPOINTMENT (OUTPATIENT)
Dept: CT IMAGING | Facility: HOSPITAL | Age: 27
End: 2022-10-11

## 2022-10-11 ENCOUNTER — HOSPITAL ENCOUNTER (EMERGENCY)
Facility: HOSPITAL | Age: 27
Discharge: HOME | End: 2022-10-11
Attending: EMERGENCY MEDICINE | Admitting: EMERGENCY MEDICINE

## 2022-10-11 VITALS
TEMPERATURE: 98.2 F | HEART RATE: 66 BPM | OXYGEN SATURATION: 98 % | DIASTOLIC BLOOD PRESSURE: 71 MMHG | RESPIRATION RATE: 16 BRPM | SYSTOLIC BLOOD PRESSURE: 126 MMHG | HEIGHT: 69 IN | WEIGHT: 250 LBS | BODY MASS INDEX: 37.03 KG/M2

## 2022-10-11 DIAGNOSIS — I16.0 HYPERTENSIVE URGENCY: Primary | ICD-10-CM

## 2022-10-11 LAB
ALBUMIN SERPL-MCNC: 4.8 G/DL (ref 3.5–5.2)
ALBUMIN/GLOB SERPL: 2.1 G/DL
ALP SERPL-CCNC: 57 U/L (ref 39–117)
ALT SERPL W P-5'-P-CCNC: 47 U/L (ref 1–33)
ANION GAP SERPL CALCULATED.3IONS-SCNC: 12 MMOL/L (ref 5–15)
AST SERPL-CCNC: 27 U/L (ref 1–32)
B-HCG UR QL: NEGATIVE
BASOPHILS # BLD AUTO: 0.05 10*3/MM3 (ref 0–0.2)
BASOPHILS NFR BLD AUTO: 0.6 % (ref 0–1.5)
BILIRUB SERPL-MCNC: 0.2 MG/DL (ref 0–1.2)
BILIRUB UR QL STRIP: NEGATIVE
BUN SERPL-MCNC: 14 MG/DL (ref 6–20)
BUN/CREAT SERPL: 17.9 (ref 7–25)
CALCIUM SPEC-SCNC: 9.7 MG/DL (ref 8.6–10.5)
CHLORIDE SERPL-SCNC: 104 MMOL/L (ref 98–107)
CLARITY UR: ABNORMAL
CO2 SERPL-SCNC: 22 MMOL/L (ref 22–29)
COLOR UR: YELLOW
CREAT SERPL-MCNC: 0.78 MG/DL (ref 0.57–1)
CRP SERPL-MCNC: <0.3 MG/DL (ref 0–0.5)
DEPRECATED RDW RBC AUTO: 39.9 FL (ref 37–54)
EGFRCR SERPLBLD CKD-EPI 2021: 105.6 ML/MIN/1.73
EOSINOPHIL # BLD AUTO: 0.14 10*3/MM3 (ref 0–0.4)
EOSINOPHIL NFR BLD AUTO: 1.6 % (ref 0.3–6.2)
ERYTHROCYTE [DISTWIDTH] IN BLOOD BY AUTOMATED COUNT: 13 % (ref 12.3–15.4)
GLOBULIN UR ELPH-MCNC: 2.3 GM/DL
GLUCOSE SERPL-MCNC: 100 MG/DL (ref 65–99)
GLUCOSE UR STRIP-MCNC: NEGATIVE MG/DL
HCT VFR BLD AUTO: 39.4 % (ref 34–46.6)
HGB BLD-MCNC: 13.4 G/DL (ref 12–15.9)
HGB UR QL STRIP.AUTO: NEGATIVE
IMM GRANULOCYTES # BLD AUTO: 0.04 10*3/MM3 (ref 0–0.05)
IMM GRANULOCYTES NFR BLD AUTO: 0.5 % (ref 0–0.5)
KETONES UR QL STRIP: NEGATIVE
LEUKOCYTE ESTERASE UR QL STRIP.AUTO: NEGATIVE
LYMPHOCYTES # BLD AUTO: 2.25 10*3/MM3 (ref 0.7–3.1)
LYMPHOCYTES NFR BLD AUTO: 25.9 % (ref 19.6–45.3)
MAGNESIUM SERPL-MCNC: 1.9 MG/DL (ref 1.6–2.6)
MCH RBC QN AUTO: 29 PG (ref 26.6–33)
MCHC RBC AUTO-ENTMCNC: 34 G/DL (ref 31.5–35.7)
MCV RBC AUTO: 85.3 FL (ref 79–97)
MONOCYTES # BLD AUTO: 0.84 10*3/MM3 (ref 0.1–0.9)
MONOCYTES NFR BLD AUTO: 9.7 % (ref 5–12)
NEUTROPHILS NFR BLD AUTO: 5.38 10*3/MM3 (ref 1.7–7)
NEUTROPHILS NFR BLD AUTO: 61.7 % (ref 42.7–76)
NITRITE UR QL STRIP: NEGATIVE
NRBC BLD AUTO-RTO: 0 /100 WBC (ref 0–0.2)
PH UR STRIP.AUTO: 6 [PH] (ref 5–8)
PLATELET # BLD AUTO: 354 10*3/MM3 (ref 140–450)
PMV BLD AUTO: 9 FL (ref 6–12)
POTASSIUM SERPL-SCNC: 3.8 MMOL/L (ref 3.5–5.2)
PROT SERPL-MCNC: 7.1 G/DL (ref 6–8.5)
PROT UR QL STRIP: NEGATIVE
QT INTERVAL: 371 MS
RBC # BLD AUTO: 4.62 10*6/MM3 (ref 3.77–5.28)
SODIUM SERPL-SCNC: 138 MMOL/L (ref 136–145)
SP GR UR STRIP: >=1.03 (ref 1–1.03)
TROPONIN T SERPL-MCNC: <0.01 NG/ML (ref 0–0.03)
TSH SERPL DL<=0.05 MIU/L-ACNC: 2.74 UIU/ML (ref 0.27–4.2)
UROBILINOGEN UR QL STRIP: ABNORMAL
WBC NRBC COR # BLD: 8.7 10*3/MM3 (ref 3.4–10.8)

## 2022-10-11 PROCEDURE — 70450 CT HEAD/BRAIN W/O DYE: CPT

## 2022-10-11 PROCEDURE — 99284 EMERGENCY DEPT VISIT MOD MDM: CPT

## 2022-10-11 PROCEDURE — 96374 THER/PROPH/DIAG INJ IV PUSH: CPT

## 2022-10-11 PROCEDURE — 25010000002 ONDANSETRON PER 1 MG: Performed by: PHYSICIAN ASSISTANT

## 2022-10-11 PROCEDURE — 84443 ASSAY THYROID STIM HORMONE: CPT | Performed by: PHYSICIAN ASSISTANT

## 2022-10-11 PROCEDURE — 71045 X-RAY EXAM CHEST 1 VIEW: CPT

## 2022-10-11 PROCEDURE — 85025 COMPLETE CBC W/AUTO DIFF WBC: CPT | Performed by: PHYSICIAN ASSISTANT

## 2022-10-11 PROCEDURE — 81003 URINALYSIS AUTO W/O SCOPE: CPT | Performed by: PHYSICIAN ASSISTANT

## 2022-10-11 PROCEDURE — 86140 C-REACTIVE PROTEIN: CPT | Performed by: PHYSICIAN ASSISTANT

## 2022-10-11 PROCEDURE — 93010 ELECTROCARDIOGRAM REPORT: CPT | Performed by: INTERNAL MEDICINE

## 2022-10-11 PROCEDURE — 80053 COMPREHEN METABOLIC PANEL: CPT | Performed by: PHYSICIAN ASSISTANT

## 2022-10-11 PROCEDURE — 83735 ASSAY OF MAGNESIUM: CPT | Performed by: PHYSICIAN ASSISTANT

## 2022-10-11 PROCEDURE — 81025 URINE PREGNANCY TEST: CPT | Performed by: PHYSICIAN ASSISTANT

## 2022-10-11 PROCEDURE — 84484 ASSAY OF TROPONIN QUANT: CPT | Performed by: PHYSICIAN ASSISTANT

## 2022-10-11 PROCEDURE — 93005 ELECTROCARDIOGRAM TRACING: CPT | Performed by: PHYSICIAN ASSISTANT

## 2022-10-11 RX ORDER — SODIUM CHLORIDE 0.9 % (FLUSH) 0.9 %
10 SYRINGE (ML) INJECTION AS NEEDED
Status: DISCONTINUED | OUTPATIENT
Start: 2022-10-11 | End: 2022-10-11 | Stop reason: HOSPADM

## 2022-10-11 RX ORDER — DIAZEPAM 10 MG/1
10 TABLET ORAL DAILY
COMMUNITY
End: 2023-01-27

## 2022-10-11 RX ORDER — ONDANSETRON 2 MG/ML
4 INJECTION INTRAMUSCULAR; INTRAVENOUS ONCE
Status: COMPLETED | OUTPATIENT
Start: 2022-10-11 | End: 2022-10-11

## 2022-10-11 RX ORDER — METOPROLOL TARTRATE 50 MG/1
50 TABLET, FILM COATED ORAL 2 TIMES DAILY
COMMUNITY
End: 2023-03-22 | Stop reason: SDUPTHER

## 2022-10-11 RX ADMIN — SODIUM CHLORIDE 500 ML: 9 INJECTION, SOLUTION INTRAVENOUS at 05:25

## 2022-10-11 RX ADMIN — ONDANSETRON 4 MG: 2 INJECTION INTRAMUSCULAR; INTRAVENOUS at 05:25

## 2022-10-11 NOTE — ED PROVIDER NOTES
MD ATTESTATION NOTE    The PETTY and I have discussed this patient's history, physical exam, and treatment plan.  I have reviewed the documentation and personally had a face to face interaction with the patient. I affirm the documentation and agree with the treatment and plan.  The attached note describes my personal findings.      I provided a substantive portion of the care of the patient.  I personally performed the physical exam in its entirety, and below are my findings.  For this patient encounter, the patient wore surgical mask, I wore full protective PPE including N95 and eye protection.      Brief HPI: This patient is a 27-year-old female presenting to the emergency room tonight secondary to significant elevations in her blood pressure with an associated headache.  The patient was recently placed on antihypertensive medication.  She denies any fever/chills, chest pain, or shortness of breath.    PHYSICAL EXAM  ED Triage Vitals   Temp Heart Rate Resp BP SpO2   10/11/22 0432 10/11/22 0432 10/11/22 0430 10/11/22 0430 10/11/22 0433   98.2 °F (36.8 °C) 86 16 (!) 144/105 100 %      Temp src Heart Rate Source Patient Position BP Location FiO2 (%)   10/11/22 0432 10/11/22 0430 10/11/22 0430 10/11/22 0430 --   Oral Monitor Sitting Left arm          GENERAL: In mild distress, nontoxic in appearance  HENT: nares patent  EYES: no scleral icterus  CV: regular rhythm, normal rate, no M/R/G  RESPIRATORY: normal effort, lungs clear bilaterally  MUSCULOSKELETAL: no deformity, no edema  NEURO: alert, moves all extremities, follows commands  PSYCH:  calm, cooperative  SKIN: warm, dry    Vital signs and nursing notes reviewed.        Plan: We will obtain an EKG, labs, chest x-ray, as well as a CT scan of the head in the ED today.  We will monitor closely and reassess following.     Moses Schilling MD  10/11/22 1303

## 2022-10-11 NOTE — DISCHARGE INSTRUCTIONS
Home, rest, home medicine as prescribed, monitor blood pressure.  Follow up with PCP for recheck and further evaluation with possible outpatient MRI. Return to care if symptoms worsen or with further concerns.

## 2022-10-11 NOTE — ED PROVIDER NOTES
EMERGENCY DEPARTMENT ENCOUNTER    Room Number:    Date of encounter:  10/11/2022  PCP: Savannah Stuart APRN  Historian: Patient      HPI:  Chief Complaint: Headache  A complete HPI/ROS/PMH/PSH/SH/FH are unobtainable due to: N/A    Context: Renea Infante is a 27 y.o. female with past medical history of hypertension who presents to the ED c/o elevated blood pressure.  Patient states that she had elevated blood pressure and started to feel nauseated, dizzy, and was having difficulty thinking of names and difficulty with word finding as well as feeling very sleepy and have a headache.  Patient states that submedial thought that maybe there was a slight facial droop.  Denies any chest pain, shortness of breath, or vision changes but felt like she might be having some palpitations.      PAST MEDICAL HISTORY  Active Ambulatory Problems     Diagnosis Date Noted   • Cough 2017   • Chest congestion 2017     Resolved Ambulatory Problems     Diagnosis Date Noted   • No Resolved Ambulatory Problems     Past Medical History:   Diagnosis Date   • Depression    • Hypertension    • Hypoglycemia    • Spina bifida occulta          PAST SURGICAL HISTORY  Past Surgical History:   Procedure Laterality Date   •  SECTION     • CYST REMOVAL     • RHINOPLASTY           FAMILY HISTORY  Family History   Problem Relation Age of Onset   • Heart disease Maternal Grandmother    • Cancer Paternal Grandmother    • Diabetes Paternal Grandmother          SOCIAL HISTORY  Social History     Socioeconomic History   • Marital status: Single   Tobacco Use   • Smoking status: Former   • Smokeless tobacco: Former     Quit date:    Substance and Sexual Activity   • Alcohol use: Yes     Comment: rarely   • Drug use: No         ALLERGIES  Penicillins        REVIEW OF SYSTEMS  Review of Systems     All systems reviewed and negative except for those discussed in HPI.       PHYSICAL EXAM    I have reviewed the triage vital signs  and nursing notes.    ED Triage Vitals   Temp Heart Rate Resp BP SpO2   10/11/22 0432 10/11/22 0432 10/11/22 0430 10/11/22 0430 10/11/22 0433   98.2 °F (36.8 °C) 86 16 (!) 144/105 100 %      Temp src Heart Rate Source Patient Position BP Location FiO2 (%)   10/11/22 0432 10/11/22 0430 10/11/22 0430 10/11/22 0430 --   Oral Monitor Sitting Left arm        Physical Exam  GENERAL: Alert and oriented x3, not distressed  HENT: moist mucous membranes  EYES: no scleral icterus, PERRL, EOMI  CV: regular rhythm, regular rate  RESPIRATORY: normal effort  ABDOMEN: soft  MUSCULOSKELETAL: no deformity, no pedal edema  NEURO: alert, moves all extremities, normal finger-nose, normal heel-to-shin,  equal bilaterally, normal strength of shoulders, no facial asymmetry, no other focal neurodeficits, follows commands  SKIN: warm, dry, no rashes      LAB RESULTS  Recent Results (from the past 24 hour(s))   Comprehensive Metabolic Panel    Collection Time: 10/11/22  5:01 AM    Specimen: Blood   Result Value Ref Range    Glucose 100 (H) 65 - 99 mg/dL    BUN 14 6 - 20 mg/dL    Creatinine 0.78 0.57 - 1.00 mg/dL    Sodium 138 136 - 145 mmol/L    Potassium 3.8 3.5 - 5.2 mmol/L    Chloride 104 98 - 107 mmol/L    CO2 22.0 22.0 - 29.0 mmol/L    Calcium 9.7 8.6 - 10.5 mg/dL    Total Protein 7.1 6.0 - 8.5 g/dL    Albumin 4.80 3.50 - 5.20 g/dL    ALT (SGPT) 47 (H) 1 - 33 U/L    AST (SGOT) 27 1 - 32 U/L    Alkaline Phosphatase 57 39 - 117 U/L    Total Bilirubin 0.2 0.0 - 1.2 mg/dL    Globulin 2.3 gm/dL    A/G Ratio 2.1 g/dL    BUN/Creatinine Ratio 17.9 7.0 - 25.0    Anion Gap 12.0 5.0 - 15.0 mmol/L    eGFR 105.6 >60.0 mL/min/1.73   C-reactive Protein    Collection Time: 10/11/22  5:01 AM    Specimen: Blood   Result Value Ref Range    C-Reactive Protein <0.30 0.00 - 0.50 mg/dL   Magnesium    Collection Time: 10/11/22  5:01 AM    Specimen: Blood   Result Value Ref Range    Magnesium 1.9 1.6 - 2.6 mg/dL   TSH    Collection Time: 10/11/22  5:01 AM     Specimen: Blood   Result Value Ref Range    TSH 2.740 0.270 - 4.200 uIU/mL   CBC Auto Differential    Collection Time: 10/11/22  5:01 AM    Specimen: Blood   Result Value Ref Range    WBC 8.70 3.40 - 10.80 10*3/mm3    RBC 4.62 3.77 - 5.28 10*6/mm3    Hemoglobin 13.4 12.0 - 15.9 g/dL    Hematocrit 39.4 34.0 - 46.6 %    MCV 85.3 79.0 - 97.0 fL    MCH 29.0 26.6 - 33.0 pg    MCHC 34.0 31.5 - 35.7 g/dL    RDW 13.0 12.3 - 15.4 %    RDW-SD 39.9 37.0 - 54.0 fl    MPV 9.0 6.0 - 12.0 fL    Platelets 354 140 - 450 10*3/mm3    Neutrophil % 61.7 42.7 - 76.0 %    Lymphocyte % 25.9 19.6 - 45.3 %    Monocyte % 9.7 5.0 - 12.0 %    Eosinophil % 1.6 0.3 - 6.2 %    Basophil % 0.6 0.0 - 1.5 %    Immature Grans % 0.5 0.0 - 0.5 %    Neutrophils, Absolute 5.38 1.70 - 7.00 10*3/mm3    Lymphocytes, Absolute 2.25 0.70 - 3.10 10*3/mm3    Monocytes, Absolute 0.84 0.10 - 0.90 10*3/mm3    Eosinophils, Absolute 0.14 0.00 - 0.40 10*3/mm3    Basophils, Absolute 0.05 0.00 - 0.20 10*3/mm3    Immature Grans, Absolute 0.04 0.00 - 0.05 10*3/mm3    nRBC 0.0 0.0 - 0.2 /100 WBC   ECG 12 Lead    Collection Time: 10/11/22  5:08 AM   Result Value Ref Range    QT Interval 371 ms   Pregnancy, Urine - Urine, Clean Catch    Collection Time: 10/11/22  5:27 AM    Specimen: Urine, Clean Catch   Result Value Ref Range    HCG, Urine QL Negative Negative   Urinalysis With Microscopic If Indicated (No Culture) - Urine, Clean Catch    Collection Time: 10/11/22  5:27 AM    Specimen: Urine, Clean Catch   Result Value Ref Range    Color, UA Yellow Yellow, Straw    Appearance, UA Cloudy (A) Clear    pH, UA 6.0 5.0 - 8.0    Specific Gravity, UA >=1.030 1.005 - 1.030    Glucose, UA Negative Negative    Ketones, UA Negative Negative    Bilirubin, UA Negative Negative    Blood, UA Negative Negative    Protein, UA Negative Negative    Leuk Esterase, UA Negative Negative    Nitrite, UA Negative Negative    Urobilinogen, UA 0.2 E.U./dL 0.2 - 1.0 E.U./dL       Ordered the above  labs and independently reviewed the results.        RADIOLOGY  CT Head Without Contrast    Result Date: 10/11/2022  Patient: DOMINIQUE DOWELL  Time Out: 05:37 Exam(s): CT HEAD Without Contrast EXAM:   CT Head Without Intravenous Contrast CLINICAL HISTORY:    Reason for exam: Headache, elevated blood pressure. TECHNIQUE:   Axial computed tomography images of the head brain without intravenous contrast.  CTDI is 54 mGy and DLP is 1049 mGy-cm.  This CT exam was performed according to the principle of ALARA (As Low As Reasonably Achievable) by using one or more of the following dose reduction techniques: automated exposure control, adjustment of the mA and or kV according to patient size, and or use of iterative reconstruction technique. COMPARISON:   9 4 2020 FINDINGS:   Brain:  Unremarkable.  No hemorrhage.  No significant white matter disease.  No edema.   Ventricles:  Unremarkable.  No ventriculomegaly.   Bones joints:  Unremarkable.  No acute fracture.   Soft tissues:  Unremarkable.   Sinuses:  Unremarkable as visualized.  No acute sinusitis.   Mastoid air cells:  Unremarkable as visualized.  No mastoid effusion. IMPRESSION:       Normal head brain CT.     Electronically signed by Sapphire Flores MD on 10-11-22 at 0537    XR Chest 1 View    Result Date: 10/11/2022  Patient: DOMINIQUE DOWELL  Time Out: 06:42 Exam(s): FILM CXR 1 VIEW EXAM:   XR Chest, 1 View CLINICAL HISTORY:    Reason for exam: TIA. TECHNIQUE:   Frontal view of the chest. COMPARISON:   11 20 2016 FINDINGS:   Lungs:  Unremarkable.  No consolidation.   Pleural space:  Unremarkable.  No pneumothorax.   Heart:  Unremarkable.  No cardiomegaly.   Mediastinum:  Unremarkable.   Bones joints:  Unremarkable. IMPRESSION:     No acute abnormality.    Electronically signed by Sapphire Flores MD on 10-11-22 at 0642      I ordered the above noted radiological studies. Reviewed by me and discussed with radiologist.  See dictation for official radiology  interpretation.      PROCEDURES    Procedures      MEDICATIONS GIVEN IN ER    Medications   sodium chloride 0.9 % flush 10 mL (has no administration in time range)   sodium chloride 0.9 % bolus 500 mL (0 mL Intravenous Stopped 10/11/22 0545)   ondansetron (ZOFRAN) injection 4 mg (4 mg Intravenous Given 10/11/22 0525)         PROGRESS, DATA ANALYSIS, CONSULTS, AND MEDICAL DECISION MAKING    All labs have been independently reviewed by me.  All radiology studies have been reviewed by me and discussed with radiologist dictating the report.   EKG's independently viewed and interpreted by me.  Discussion below represents my analysis of pertinent findings related to patient's condition, differential diagnosis, treatment plan and final disposition.    DDx: Includes but limited to hypertensive urgency, encephalopathy, TIA, stroke    ED Course as of 10/11/22 0649   Tue Oct 11, 2022   0515 EKG          EKG time: 5:08  Rhythm/Rate: Sinus rhythm at 75 bpm  P waves and CT: Normal  QRS, axis: Normal  ST and T waves: No acute ST/T wave changes    Interpreted Contemporaneously by me, independently viewed  EKG is changed compared to prior on 11/20/2016, the rate is slower.   [LOLIS]   0555 HCG, Urine QL: Negative [LOLIS]   0555 WBC: 8.70 [LOLIS]   0555 Hemoglobin: 13.4 [LOLIS]   0555 Hematocrit: 39.4 [LOLIS]   0555 Platelets: 354 [LOLIS]   0555 Glucose(!): 100 [LOLIS]   0555 BUN: 14 [LOLIS]   0555 Creatinine: 0.78 [LOLIS]   0555 Sodium: 138 [LOLIS]   0555 Potassium: 3.8 [LOLIS]   0555 Magnesium: 1.9 [LOLIS]   0555 Chloride: 104 [LOLIS]   0555 CO2: 22.0 [LOLIS]   0555 C-Reactive Protein: <0.30 [LOLIS]   0602 TSH Baseline: 2.740 [LOLIS]   0613 Patient rechecked, blood pressure improved to 126/71.  Symptoms are improved.  Discussed results and recommendation for placement in the observation for further evaluation.  Patient declined admission to the observation unit.  Discussed risks and benefits and strict return ER precautions, patient expressed understanding. [LOLIS]      ED Course  User Index  [LOLIS] Neftaly Palmer PA       MDM: Patient's ER evaluation is unremarkable, patient was recommended for placement in the observation unit for further evaluation to be a possible TIA, however she declined admission.  Patient was given strict return to ER precautions and strongly encouraged to return to care if symptoms were to change or worsen.    PPE: The patient wore a surgical mask throughout the entire patient encounter. I wore an N95.    AS OF 06:49 EDT VITALS:    BP - 126/71  HR - 66  TEMP - 98.2 °F (36.8 °C) (Oral)  O2 SATS - 98%        DIAGNOSIS  Final diagnoses:   Hypertensive urgency         DISPOSITION  DISCHARGE    Patient discharged in stable condition.    Reviewed implications of results, diagnosis, meds, responsibility to follow up, warning signs and symptoms of possible worsening, potential complications and reasons to return to ER.    Patient/Family voiced understanding of above instructions.    Discussed plan for discharge, as there is no emergent indication for admission. Patient referred to primary care provider for BP management due to today's BP. Pt/family is agreeable and understands need for follow up and repeat testing.  Pt is aware that discharge does not mean that nothing is wrong but it indicates no emergency is present that requires admission and they must continue care with follow-up as given below or physician of their choice.     FOLLOW-UP  Savannah Stuart, APRN  [address] [facility]  [address]  [place] [id number] [phone]    Schedule an appointment as soon as possible for a visit in 2 days           Medication List      No changes were made to your prescriptions during this visit.           Note Disclaimer: At [facility], we believe that sharing information builds trust and better relationships. You are receiving this note because you recently visited [facility]. It is possible you will see health information before a provider has talked with you  about it. This kind of information can be easy to misunderstand. To help you fully understand what it means for your health, we urge you to discuss this note with your provider.       Neftaly Palmer PA  10/11/22 0651

## 2022-10-11 NOTE — ED NOTES
Pt reports HTN, nausea, trouble finding words, headache, and left sided neck pain. Pt reports trouble finding words has since resolved but other complaints still persist. Pt took 1000 mg of tylenol and 400 of ibuprofen 2.5 hours ago. Pt is now reporting dizziness. Blood sugar was 104. Pt reports feeling heart jumping earlier in the night. Pt is alert and oriented x4.

## 2022-10-17 ENCOUNTER — TRANSCRIBE ORDERS (OUTPATIENT)
Dept: ADMINISTRATIVE | Facility: HOSPITAL | Age: 27
End: 2022-10-17

## 2022-10-17 DIAGNOSIS — G45.9 INTERMITTENT CEREBRAL ISCHEMIA: Primary | ICD-10-CM

## 2022-10-18 ENCOUNTER — HOSPITAL ENCOUNTER (OUTPATIENT)
Dept: MRI IMAGING | Facility: HOSPITAL | Age: 27
Discharge: HOME | End: 2022-10-18
Admitting: NURSE PRACTITIONER

## 2022-10-18 DIAGNOSIS — G45.9 INTERMITTENT CEREBRAL ISCHEMIA: ICD-10-CM

## 2022-10-18 PROCEDURE — A9577 INJ MULTIHANCE: HCPCS | Performed by: NURSE PRACTITIONER

## 2022-10-18 PROCEDURE — 0 GADOBENATE DIMEGLUMINE 529 MG/ML SOLUTION: Performed by: NURSE PRACTITIONER

## 2022-10-18 PROCEDURE — 70553 MRI BRAIN STEM W/O & W/DYE: CPT

## 2022-10-18 RX ADMIN — GADOBENATE DIMEGLUMINE 20 ML: 529 INJECTION, SOLUTION INTRAVENOUS at 08:10

## 2022-11-30 ENCOUNTER — OFFICE VISIT (OUTPATIENT)
Dept: CARDIOLOGY | Facility: CLINIC | Age: 27
End: 2022-11-30

## 2022-11-30 VITALS
HEART RATE: 90 BPM | OXYGEN SATURATION: 96 % | SYSTOLIC BLOOD PRESSURE: 128 MMHG | HEIGHT: 69 IN | WEIGHT: 252.2 LBS | DIASTOLIC BLOOD PRESSURE: 86 MMHG | BODY MASS INDEX: 37.36 KG/M2

## 2022-11-30 DIAGNOSIS — I11.9 HYPERTENSIVE HEART DISEASE WITHOUT HEART FAILURE: Primary | ICD-10-CM

## 2022-11-30 PROCEDURE — 99204 OFFICE O/P NEW MOD 45 MIN: CPT | Performed by: INTERNAL MEDICINE

## 2022-11-30 RX ORDER — MAGNESIUM OXIDE 400 MG/1
TABLET ORAL
COMMUNITY
End: 2023-01-27 | Stop reason: SDUPTHER

## 2022-11-30 RX ORDER — ASPIRIN 81 MG/1
81 TABLET, CHEWABLE ORAL DAILY
COMMUNITY
End: 2023-01-27 | Stop reason: SDUPTHER

## 2022-11-30 NOTE — PROGRESS NOTES
[facility]      Patient Name: Renea Infante  :1995  Age: 27 y.o.  Encounter Provider:  Neftaly Tam Jr, MD      Chief Complaint:   Chief Complaint   Patient presents with   • Hypertension         HPI  Renea Infante is a 27 y.o. female past medical history of bipolar disorder, resistant hypertension who presents for initial evaluation.  With the psychiatric issues she has had trouble with medical compliance in the past but a few months ago her blood pressure really shot up when she was off of medications and she has been back on a fairly stable regimen recently.  She did have some neurological deficits for which she had a brain MRI which was without intracranial pathology per patient report.  She does admit to snoring but had a negative sleep study 2 years ago.  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  She denies chest pain or shortness of air with activity.  No orthopnea, PND or edema.  She has occasional palpitations which she says will happen about 1 time per month.  She feels a fleeting sensation of palpitations and shortness of breath which has no relationship to physical activity.  Blood pressure and heart rate are fairly well controlled today in clinic.  Family history includes father who passed away at age 38 from unknown causes as there was no autopsy however he had a history of severe back problems with spina bifida and spondylolisthesis and was abusing opiate medications.  She is a former smoker quit in  denies alcohol or illicit drug use.  No cardiac complaints at time of interview      The following portions of the patient's history were reviewed and updated as appropriate: allergies, current medications, past family history, past medical history, past social history, past surgical history and problem list.      Review of Systems   Constitutional: Negative for chills and fever.   HENT: Negative for hoarse voice and sore throat.    Eyes: Negative for  "double vision and photophobia.   Cardiovascular: Positive for palpitations. Negative for chest pain, leg swelling, near-syncope, orthopnea, paroxysmal nocturnal dyspnea and syncope.   Respiratory: Negative for cough and wheezing.    Skin: Negative for poor wound healing and rash.   Musculoskeletal: Negative for arthritis and joint swelling.   Gastrointestinal: Negative for bloating, abdominal pain, hematemesis and hematochezia.   Neurological: Negative for dizziness and focal weakness.   Psychiatric/Behavioral: Negative for depression and suicidal ideas.       OBJECTIVE:   Vital Signs  Vitals:    11/30/22 0831   BP: 128/86   Pulse: 90   SpO2: 96%     Estimated body mass index is 37.24 kg/m² as calculated from the following:    Height as of this encounter: 175.3 cm (69\").    Weight as of this encounter: 114 kg (252 lb 3.2 oz).    Vitals reviewed.   Constitutional:       Appearance: Healthy appearance. Not in distress.   Neck:      Vascular: No JVR. JVD normal.   Pulmonary:      Effort: Pulmonary effort is normal.      Breath sounds: Normal breath sounds. No wheezing. No rhonchi. No rales.   Chest:      Chest wall: Not tender to palpatation.   Cardiovascular:      PMI at left midclavicular line. Normal rate. Regular rhythm. Normal S1. Normal S2.      Murmurs: There is no murmur.      No gallop. No click. No rub.   Pulses:     Intact distal pulses.   Edema:     Peripheral edema absent.   Abdominal:      General: Bowel sounds are normal.      Palpations: Abdomen is soft.      Tenderness: There is no abdominal tenderness.   Musculoskeletal: Normal range of motion.         General: No tenderness. Skin:     General: Skin is warm and dry.   Neurological:      General: No focal deficit present.      Mental Status: Alert and oriented to person, place and time.         Procedures          ASSESSMENT:     Resistant hypertension  Bipolar disorder    PLAN OF CARE:     1. Resistant hypertension -at this point I think it would be " important to start a work-up for secondary causes.  We will plan for renal artery Doppler.  Check 24-hour urine metanephrines and random cortisol level.  We will check an echocardiogram.  Twice daily blood pressure log to be sent in after 1 week.  3000 mg daily sodium restriction.  2. Bipolar disorder -we had a long talk about caution with pharmacological intervention with stimulants.  She will continue to monitor closely after seeing psychiatry this month.  3. Hypersomnia -Per patient report she does not have sleep apnea by diagnostic study performed 2 years ago we will monitor closely.    Counseled on need for weight loss and increased activity.  Sodium restriction as noted above.  Work-up for secondary causes as noted above.  Return to clinic 3 months.             Discharge Medications          Accurate as of November 30, 2022  8:34 AM. If you have any questions, ask your nurse or doctor.            Continue These Medications      Instructions Start Date   amLODIPine 10 MG tablet  Commonly known as: NORVASC   Take 1 tablet by mouth daily      aspirin 81 MG chewable tablet   81 mg, Oral, Daily      diazePAM 10 MG tablet  Commonly known as: VALIUM   10 mg, Oral, Daily      magnesium oxide 400 MG tablet  Commonly known as: MAG-OX   magnesium oxide 400 mg (241.3 mg magnesium) tablet   TAKE 2 TABLETS BY MOUTH EVERY DAY AT BEDTIME      metoprolol tartrate 50 MG tablet  Commonly known as: LOPRESSOR   50 mg, Oral, 2 Times Daily      spironolactone 100 MG tablet  Commonly known as: ALDACTONE   Take 1 tablet by mouth daily             Thank you for allowing me to participate in the care of your patient,      Sincerely,   Neftaly Tam MD  [facility]  11/30/22  08:34 EST

## 2022-12-08 ENCOUNTER — HOSPITAL ENCOUNTER (OUTPATIENT)
Dept: CARDIOLOGY | Facility: HOSPITAL | Age: 27
Discharge: HOME | End: 2022-12-08

## 2022-12-08 ENCOUNTER — LAB (OUTPATIENT)
Dept: LAB | Facility: HOSPITAL | Age: 27
End: 2022-12-08

## 2022-12-08 DIAGNOSIS — I11.9 HYPERTENSIVE HEART DISEASE WITHOUT HEART FAILURE: ICD-10-CM

## 2022-12-08 LAB
BH CV ECHO MEAS - DIST REN A EDV LEFT: 36.1 CM/S
BH CV ECHO MEAS - DIST REN A PSV LEFT: 102 CM/S
BH CV ECHO MEAS - MID REN A EDV LEFT: 25.1 CM/S
BH CV ECHO MEAS - MID REN A PSV LEFT: 77.1 CM/S
BH CV ECHO MEAS - PROX REN A EDV LEFT: -55 CM/S
BH CV ECHO MEAS - PROX REN A PSV LEFT: -154 CM/S
BH CV VAS BP LEFT ARM: NORMAL MMHG
BH CV VAS BP RIGHT ARM: NORMAL MMHG
BH CV VAS KIDNEY HEIGHT LEFT: 5.7 CM
BH CV VAS RENAL AORTIC MID EDV: 29.2 CM/S
BH CV VAS RENAL AORTIC MID PSV: 120 CM/S
BH CV VAS RENAL HILUM LEFT EDV: 16.9 CM/S
BH CV VAS RENAL HILUM LEFT PSV: 42 CM/S
BH CV VAS RENAL HILUM RIGHT EDV: 15.5 CM/S
BH CV VAS RENAL HILUM RIGHT PSV: 57 CM/S
BH CV XLRA MEAS - KID L LEFT: 13.4 CM
BH CV XLRA MEAS DIST REN A EDV RIGHT: 40.9 CM/S
BH CV XLRA MEAS DIST REN A PSV RIGHT: 126 CM/S
BH CV XLRA MEAS KID H RIGHT: 6.1 CM
BH CV XLRA MEAS KID L RIGHT: 11.5 CM
BH CV XLRA MEAS MID REN A EDV RIGHT: 22.6 CM/S
BH CV XLRA MEAS MID REN A PSV RIGHT: 71.5 CM/S
BH CV XLRA MEAS PROX REN A EDV RIGHT: -50.1 CM/S
BH CV XLRA MEAS PROX REN A PSV RIGHT: -152 CM/S
BH CV XLRA MEAS RAR LEFT: 1.28
BH CV XLRA MEAS RAR RIGHT: 1.27
BH CV XLRA MEAS RENAL A ORG EDV LEFT: -29.3 CM/S
BH CV XLRA MEAS RENAL A ORG EDV RIGHT: -34.2 CM/S
BH CV XLRA MEAS RENAL A ORG PSV LEFT: -132 CM/S
BH CV XLRA MEAS RENAL A ORG PSV RIGHT: -110 CM/S
LEFT RENAL UPPER PARENCHYMA MAX: 25.1 CM/S
LEFT RENAL UPPER PARENCHYMA MIN: 9.17 CM/S
LEFT RENAL UPPER PARENCHYMA RI: 0.63
MAXIMAL PREDICTED HEART RATE: 191 BPM
RIGHT RENAL UPPER PARENCHYMA MAX: 36.7 CM/S
RIGHT RENAL UPPER PARENCHYMA MIN: 10.7 CM/S
RIGHT RENAL UPPER PARENCHYMA RI: 0.71
STRESS TARGET HR: 162 BPM

## 2022-12-08 PROCEDURE — 93975 VASCULAR STUDY: CPT

## 2022-12-12 ENCOUNTER — LAB (OUTPATIENT)
Dept: LAB | Facility: HOSPITAL | Age: 27
End: 2022-12-12

## 2022-12-12 PROCEDURE — 83835 ASSAY OF METANEPHRINES: CPT

## 2022-12-12 PROCEDURE — 81050 URINALYSIS VOLUME MEASURE: CPT

## 2022-12-16 LAB
METANEPH 24H UR-MRATE: 161 UG/24 HR (ref 36–209)
METANEPHS 24H UR-MCNC: 146 UG/L
NORMETANEPHRINE 24H UR-MCNC: 478 UG/L
NORMETANEPHRINE 24H UR-MRATE: 526 UG/24 HR (ref 95–449)

## 2022-12-19 ENCOUNTER — TELEPHONE (OUTPATIENT)
Dept: CARDIOLOGY | Facility: CLINIC | Age: 27
End: 2022-12-19

## 2022-12-19 NOTE — TELEPHONE ENCOUNTER
Spoke to patient about urine labs.  Nor metanephrine values slightly elevated but metanephrines are normal.  We will continue to follow laboratory evaluation.

## 2023-01-27 ENCOUNTER — OFFICE VISIT (OUTPATIENT)
Dept: NEUROLOGY | Facility: CLINIC | Age: 28
End: 2023-01-27
Payer: COMMERCIAL

## 2023-01-27 VITALS
HEART RATE: 83 BPM | DIASTOLIC BLOOD PRESSURE: 74 MMHG | OXYGEN SATURATION: 97 % | SYSTOLIC BLOOD PRESSURE: 132 MMHG | WEIGHT: 243 LBS | HEIGHT: 69 IN | BODY MASS INDEX: 35.99 KG/M2

## 2023-01-27 DIAGNOSIS — G43.109 MIGRAINE WITH AURA AND WITHOUT STATUS MIGRAINOSUS, NOT INTRACTABLE: Primary | ICD-10-CM

## 2023-01-27 PROCEDURE — 99204 OFFICE O/P NEW MOD 45 MIN: CPT | Performed by: PSYCHIATRY & NEUROLOGY

## 2023-01-27 RX ORDER — MAGNESIUM OXIDE TAB 400 MG (241.3 MG ELEMENTAL MG) 400 (241.3 MG) MG
2 TAB ORAL
COMMUNITY
Start: 2022-11-29 | End: 2023-01-27 | Stop reason: SDUPTHER

## 2023-01-27 RX ORDER — RIMEGEPANT SULFATE 75 MG/75MG
75 TABLET, ORALLY DISINTEGRATING ORAL EVERY OTHER DAY
Qty: 16 TABLET | Refills: 2 | Status: SHIPPED | OUTPATIENT
Start: 2023-01-27

## 2023-01-27 NOTE — ASSESSMENT & PLAN NOTE
27 year old right handed woman with spells of word finding difficulty and memory issues which maybe related to hypertensive encephalopathy vs migraine with aura.  She reports the spells starting 5+ years ago.  She reports a history of migraines starting when she was 8 years old.  She has headaches in the back of her head/base of her skull and temples and front of her head with throbbing quality 10/10 on pain scale 1-10 when most severe with light and sound sensitivity along with nausea and vomiting.  They can last up to 3 days or longer.  She gets at least 1 severe migraine per month and 3 headache days per week.  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  She was at work most recently with a spell which was witnessed when she was talking to another nurse and she knew who she was but could not say her name and when she was able to talk it was slurred and her BP was elevated in the 180s.  It has been as high as 220s/110s in the past prior to medication.  Her BP is 132/74 today.  She had a head CT scan on 10/11/2022 which I reviewed independently on her visit today and looks normal.  She had a brain MRI scan on 10/18/2022 which I reviewed the images independently on her visit today and this also looks normal.  She was not experiencing a migraine at the time.  Her symptoms resolve in about 30 minutes or so.  She is currently on a baby aspirin.  She has auras with her migraines including seeing colors in her peripheral vision and she can lose peripheral vision as well.  She has a implantable birthcontrol.  She is a former smoker and has not smoked for 3 years.  She is currently on combination of spironolactone, amlodipine and metoprolol.  She does not sleep well as she works 3rd shift.  No family history of seizures.  No history of seizures in childhood.  She denies history of kidney stones.  There is family history of migraines in her mother.  She has taken topiramate in the past which she does not think  helps.  I will star there on Nurtec ODT for acute and prevention of her migraines.  Discussed migraine triggers and lifestyle modifications.  I advised her to follow up with her cardiologist with regards to further management of her HTN.  Given TIA like symptoms and history of HTN I agree with continuing the 81 mg aspirin daily as long as no contraindications.

## 2023-01-27 NOTE — PROGRESS NOTES
Chief Complaint  Transient Ischemic Attack (Pt referred to office for TIA. PT states she went to ER for memory loss and slurred speech. She states this has happened before in past however this time she was at work and others noticed it at that time. /) vs spells vs complicated migraines    Subjective          Renea Infante presents to [facility] NEUROLOGY for   HISTORY OF PRESENT ILLNESS:    Renea Infante is a 27 year old right handed woman who presents to neurology clinic for initial evaluation and treatment of spells of word finding difficulty and memory issues.  She reports the spells starting 5+ years ago.  She reports a history of migraines starting when she was 8 years old.  She has headaches in the back of her head/base of her skull and temples and front of her head with throbbing quality 10/10 on pain scale 1-10 when most severe with light and sound sensitivity along with nausea and vomiting.  They can last up to 3 days or longer.  She gets at least 1 severe migraine per month and 3 headache days per week.  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  She was at work most recently with a spell which was witnessed when she was talking to another nurse and she knew who she was but could not say her name and when she was able to talk it was slurred and her BP was elevated in the 180s.  It has been as high as 220s/110s in the past prior to medication.  Her BP is 132/74 today.  She had a head CT scan on 10/11/2022 which I reviewed independently on her visit today and looks normal.  She had a brain MRI scan on 10/18/2022 which I reviewed the images independently on her visit today and this also looks normal.  She was not experiencing a migraine at the time.  Her symptoms resolve in about 30 minutes or so.  She is currently on a baby aspirin.  She has auras with her migraines including seeing colors in her peripheral vision and she can lose peripheral vision as well.  She has a  "implantable birthcontrol.  She is a former smoker and has not smoked for 3 years.  She is currently on combination of spironolactone, amlodipine and metoprolol.  She does not sleep well as she works 3rd shift.  No family history of seizures.  No history of seizures in childhood.  She denies history of kidney stones.  There is family history of migraines in her mother.  She has taken topiramate in the past which she does not think helps.      Past Medical History:   Diagnosis Date   • Depression    • Hidradenitis suppurativa 2020   • Hypertension    • Hypoglycemia    • Spina bifida occulta         Family History   Problem Relation Age of Onset   • Hypertension Mother    • Hypertension Father    • Heart disease Maternal Grandmother    • Cancer Paternal Grandmother    • Diabetes Paternal Grandmother         Social History     Socioeconomic History   • Marital status: Single   Tobacco Use   • Smoking status: Former   • Smokeless tobacco: Former     Quit date: 2015   Vaping Use   • Vaping Use: Former   Substance and Sexual Activity   • Alcohol use: Not Currently   • Drug use: No   • Sexual activity: Defer        I have reviewed and confirmed the accuracy of the ROS as documented by the MA/LPN/RN Zack De Oliveira MD    Review of Systems   Constitutional: Negative for activity change, appetite change and fatigue.   Musculoskeletal: Negative for back pain, gait problem and neck pain.   Neurological: Positive for dizziness, tremors, speech difficulty, light-headedness, headache and memory problem. Negative for seizures, syncope, facial asymmetry, weakness, numbness and confusion.   Psychiatric/Behavioral: Negative for agitation, behavioral problems, decreased concentration, dysphoric mood, hallucinations, self-injury, sleep disturbance, suicidal ideas, negative for hyperactivity, depressed mood and stress. The patient is not nervous/anxious.         Objective   Vital Signs:   /74   Pulse 83   Ht 175.3 cm (69\")   Wt " 110 kg (243 lb)   SpO2 97%   BMI 35.88 kg/m²       PHYSICAL EXAM:    General   Mental Status - Alert. General Appearance - Overweight, Well groomed, Oriented and Cooperative. Orientation - Oriented X3.       Head and Neck  Head - normocephalic, atraumatic with no lesions or palpable masses.  Neck    Global Assessment - supple.       Eye   Sclera/Conjunctiva - Bilateral - Normal.    ENMT  Mouth and Throat   Oral Cavity/Oropharynx: Oropharynx - the soft palate,uvula and tongue are normal in appearance.    Chest and Lung Exam   Chest - lung clear to auscultation bilaterally.    Cardiovascular   Cardiovascular examination reveals  - normal heart sounds, regular rate and rhythm.    Neurologic   Mental Status: Speech - Normal. Cognitive function - appropriate fund of knowledge. No impairment of attention, Impairment of concentration, impairment of long term memory or impairment of short term memory.  Cranial Nerves:   II Optic: Visual acuity - Left - Normal. Right - Normal. Visual fields - Normal (to confrontation).  III Oculomotor: Pupillary constriction - Left - Normal. Right - Normal.  VII Facial: - Normal Bilaterally.   IX Glossopharyngeal / X Vagus - Normal.  XI Accessory: Trapezius - Bilateral - Normal. Sternocleidomastoid - Bilateral - Normal.  XII Hypoglossal - Bilateral - Normal.  Eye Movements: - Normal Bilaterally.  Sensory:   Light Touch: Intact - Globally.  Motor:   Bulk and Contour: - Normal.  Tone: - Normal.  Tremor: Not present.  Strength: 5/5 normal muscle strength - All Muscles.   General Assessment of Reflexes: - deep tendon reflexes are normal. Coordination - No Impairment of finger-to-nose or Impairment of rapid alternating movements. Gait - Normal.          Result Review :                 Assessment and Plan    Problem List Items Addressed This Visit        Neuro    Migraine with aura and without status migrainosus, not intractable - Primary    Current Assessment & Plan     27 year old right handed  woman with spells of word finding difficulty and memory issues which maybe related to hypertensive encephalopathy vs migraine with aura.  She reports the spells starting 5+ years ago.  She reports a history of migraines starting when she was 8 years old.  She has headaches in the back of her head/base of her skull and temples and front of her head with throbbing quality 10/10 on pain scale 1-10 when most severe with light and sound sensitivity along with nausea and vomiting.  They can last up to 3 days or longer.  She gets at least 1 severe migraine per month and 3 headache days per week.  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  She was at work most recently with a spell which was witnessed when she was talking to another nurse and she knew who she was but could not say her name and when she was able to talk it was slurred and her BP was elevated in the 180s.  It has been as high as 220s/110s in the past prior to medication.  Her BP is 132/74 today.  She had a head CT scan on 10/11/2022 which I reviewed independently on her visit today and looks normal.  She had a brain MRI scan on 10/18/2022 which I reviewed the images independently on her visit today and this also looks normal.  She was not experiencing a migraine at the time.  Her symptoms resolve in about 30 minutes or so.  She is currently on a baby aspirin.  She has auras with her migraines including seeing colors in her peripheral vision and she can lose peripheral vision as well.  She has a implantable birthcontrol.  She is a former smoker and has not smoked for 3 years.  She is currently on combination of spironolactone, amlodipine and metoprolol.  She does not sleep well as she works 3rd shift.  No family history of seizures.  No history of seizures in childhood.  She denies history of kidney stones.  There is family history of migraines in her mother.  She has taken topiramate in the past which she does not think helps.  I will star  there on Nurtec ODT for acute and prevention of her migraines.  Discussed migraine triggers and lifestyle modifications.  I advised her to follow up with her cardiologist with regards to further management of her HTN.  Given TIA like symptoms and history of HTN I agree with continuing the 81 mg aspirin daily as long as no contraindications.                  I spent time caring for Renea on this date of service. This time includes time spent by me in the following activities:preparing for the visit, reviewing tests, obtaining and/or reviewing a separately obtained history, performing a medically appropriate examination and/or evaluation , counseling and educating the patient/family/caregiver, ordering medications, tests, or procedures, documenting information in the medical record, independently interpreting results and communicating that information with the patient/family/caregiver and care coordination    Follow Up   Return in about 3 months (around 4/27/2023).  Patient was given instructions and counseling regarding her condition or for health maintenance advice. Please see specific information pulled into the AVS if appropriate.

## 2023-02-02 ENCOUNTER — PATIENT ROUNDING (BHMG ONLY) (OUTPATIENT)
Dept: NEUROLOGY | Facility: CLINIC | Age: 28
End: 2023-02-02
Payer: COMMERCIAL

## 2023-03-22 ENCOUNTER — OFFICE VISIT (OUTPATIENT)
Dept: CARDIOLOGY | Facility: CLINIC | Age: 28
End: 2023-03-22
Payer: COMMERCIAL

## 2023-03-22 VITALS
WEIGHT: 244 LBS | OXYGEN SATURATION: 98 % | DIASTOLIC BLOOD PRESSURE: 76 MMHG | SYSTOLIC BLOOD PRESSURE: 120 MMHG | BODY MASS INDEX: 36.14 KG/M2 | HEIGHT: 69 IN | HEART RATE: 60 BPM

## 2023-03-22 DIAGNOSIS — R00.2 PALPITATIONS: Primary | ICD-10-CM

## 2023-03-22 DIAGNOSIS — I11.9 HYPERTENSIVE HEART DISEASE WITHOUT HEART FAILURE: ICD-10-CM

## 2023-03-22 PROCEDURE — 99214 OFFICE O/P EST MOD 30 MIN: CPT | Performed by: NURSE PRACTITIONER

## 2023-03-22 RX ORDER — METOPROLOL TARTRATE 50 MG/1
50 TABLET, FILM COATED ORAL 2 TIMES DAILY
Qty: 180 TABLET | Refills: 3 | Status: SHIPPED | OUTPATIENT
Start: 2023-03-22

## 2023-03-22 RX ORDER — AMLODIPINE BESYLATE 10 MG/1
10 TABLET ORAL DAILY
Qty: 90 TABLET | Refills: 3 | Status: SHIPPED | OUTPATIENT
Start: 2023-03-22

## 2023-03-22 RX ORDER — SPIRONOLACTONE 100 MG/1
100 TABLET, FILM COATED ORAL DAILY
Qty: 90 TABLET | Refills: 3 | Status: SHIPPED | OUTPATIENT
Start: 2023-03-22

## 2023-03-22 NOTE — PROGRESS NOTES
"Date of Office Visit: 23  Encounter Provider: DAVID Francois  Place of Service: [facility] CARDIOLOGY  Patient Name: Renea Infante  :1995    Chief Complaint   Patient presents with   • Hypertensive heart disease without heart failure   • Follow-up   :     HPI: Renea Infante is a 27 y.o. female  with bipolar disorder, resistant hypertension, negative sleep study, obesity and migraine disorder.       She is followed by Dr. Neftaly Tam. I will visit with her for the first time and have reviewed her medical record.     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  she had hypertensive urgency 10/2022. She had normal renal artery duplex bilateral 2022. She had a 24 hour urine for metanephrines and had elevated normetanephrine felt to be not significant to pursue additional test such as CT abd.   She presents today for reassessment.  She is doing kickboxing 2 days a week for 45 minutes 1 hour.  She has no chest pain or shortness of breath or exertional symptoms with that.  Normally her blood pressure is 120/60-70 but occasionally after work is 140-150 systolic.  This resolves quickly.  She has migraine headache and occasional palpitations.  Her palpitations are once or twice a month.  Prior echo was not covered by insurance reportedly.  She was prescribed Nurtec and Imitrex but has not taken them yet since debilitating migraines are overall infrequent.      Allergies   Allergen Reactions   • Penicillins Unknown (See Comments)     Pt is unsure           Family and social history reviewed.     ROS  All other systems were reviewed and are negative          Objective:     Vitals:    23 0928   BP: 120/76   BP Location: Left arm   Patient Position: Sitting   Pulse: 60   SpO2: 98%   Weight: 111 kg (244 lb)   Height: 175.3 cm (69\")     Body mass index is 36.03 kg/m².    PHYSICAL EXAM:  Pulmonary:      Effort: Pulmonary effort is normal.      Breath sounds: Normal " breath sounds.   Cardiovascular:      Normal rate. Regular rhythm.         Procedures      Current Outpatient Medications   Medication Sig Dispense Refill   • amLODIPine (NORVASC) 10 MG tablet Take 1 tablet by mouth Daily. 90 tablet 3   • amphetamine-dextroamphetamine XR (Adderall XR) 20 MG 24 hr capsule Take 1 capsule by mouth Every Morning 30 capsule 0   • aspirin 81 MG EC tablet Take 1 tablet by mouth Daily. 90 tablet 0   • magnesium oxide (MAG-OX) 400 MG tablet Take 2 tablets by mouth every night at bedtime. 60 tablet 0   • metoprolol tartrate (LOPRESSOR) 50 MG tablet Take 1 tablet by mouth 2 (Two) Times a Day. 180 tablet 3   • Rimegepant Sulfate (Nurtec) 75 MG tablet dispersible tablet Place 1 tablet by mouth Every Other Day. 16 tablet 2   • spironolactone (ALDACTONE) 100 MG tablet Take 1 tablet by mouth daily 90 tablet 3     No current facility-administered medications for this visit.     Assessment:       Diagnosis Plan   1. Palpitations  Adult Transthoracic Echo Complete W/ Cont if Necessary Per Protocol      2. Hypertensive heart disease without heart failure             Orders Placed This Encounter   Procedures   • Adult Transthoracic Echo Complete W/ Cont if Necessary Per Protocol     Standing Status:   Future     Standing Expiration Date:   3/21/2024     Order Specific Question:   Reason for exam?     Answer:   Palpitations         Plan:       1. 27-year-old female with resistant hypertension.  She is adequately controlled on amlodipine 10 mg daily, metoprolol 50 mg twice daily and spironolactone 100 mg daily.  She had a negative renal artery duplex October 2022.  24-hour urine metanephrine showed mild elevation of normetaneprine. Would hold off on further secondary HTN workout at this time.   2. Intermittent palpitations- once or twice a month and fleeting. Check echo.  3. Obesity BMI 36.06. she has lost approx 8 pounds since 11/2022. Encouraged further weight loss  4. migraine headache follows with  neuro      Call with questions or concerns.   Follow up in 6 months.             It has been a pleasure to participate in this patient's care.      Thank you,  DAVID Francois      **I used Dragon to dictate this note:**

## 2023-03-23 DIAGNOSIS — I10 RESISTANT HYPERTENSION: Primary | ICD-10-CM

## 2023-04-20 ENCOUNTER — HOSPITAL ENCOUNTER (OUTPATIENT)
Dept: CARDIOLOGY | Facility: HOSPITAL | Age: 28
Discharge: HOME | End: 2023-04-20
Admitting: NURSE PRACTITIONER
Payer: COMMERCIAL

## 2023-04-20 VITALS
DIASTOLIC BLOOD PRESSURE: 70 MMHG | HEART RATE: 68 BPM | SYSTOLIC BLOOD PRESSURE: 138 MMHG | WEIGHT: 244 LBS | HEIGHT: 69 IN | BODY MASS INDEX: 36.14 KG/M2

## 2023-04-20 DIAGNOSIS — R00.2 PALPITATIONS: ICD-10-CM

## 2023-04-20 LAB
AORTIC ARCH: 2.3 CM
ASCENDING AORTA: 3.1 CM
BH CV ECHO MEAS - ACS: 2.18 CM
BH CV ECHO MEAS - AO MAX PG: 9.4 MMHG
BH CV ECHO MEAS - AO MEAN PG: 5 MMHG
BH CV ECHO MEAS - AO ROOT DIAM: 3.1 CM
BH CV ECHO MEAS - AO V2 MAX: 153 CM/SEC
BH CV ECHO MEAS - AO V2 VTI: 31.5 CM
BH CV ECHO MEAS - AVA(I,D): 2.42 CM2
BH CV ECHO MEAS - EDV(CUBED): 91.6 ML
BH CV ECHO MEAS - EDV(MOD-SP2): 99 ML
BH CV ECHO MEAS - EDV(MOD-SP4): 104 ML
BH CV ECHO MEAS - EF(MOD-BP): 66.3 %
BH CV ECHO MEAS - EF(MOD-SP2): 65.7 %
BH CV ECHO MEAS - EF(MOD-SP4): 65.4 %
BH CV ECHO MEAS - ESV(CUBED): 14.6 ML
BH CV ECHO MEAS - ESV(MOD-SP2): 34 ML
BH CV ECHO MEAS - ESV(MOD-SP4): 36 ML
BH CV ECHO MEAS - FS: 45.8 %
BH CV ECHO MEAS - IVS/LVPW: 1.08 CM
BH CV ECHO MEAS - IVSD: 1.01 CM
BH CV ECHO MEAS - LAT PEAK E' VEL: 17.2 CM/SEC
BH CV ECHO MEAS - LV DIASTOLIC VOL/BSA (35-75): 46.3 CM2
BH CV ECHO MEAS - LV MASS(C)D: 148 GRAMS
BH CV ECHO MEAS - LV MAX PG: 5.9 MMHG
BH CV ECHO MEAS - LV MEAN PG: 3 MMHG
BH CV ECHO MEAS - LV SYSTOLIC VOL/BSA (12-30): 16 CM2
BH CV ECHO MEAS - LV V1 MAX: 121 CM/SEC
BH CV ECHO MEAS - LV V1 VTI: 27.4 CM
BH CV ECHO MEAS - LVIDD: 4.5 CM
BH CV ECHO MEAS - LVIDS: 2.44 CM
BH CV ECHO MEAS - LVOT AREA: 2.8 CM2
BH CV ECHO MEAS - LVOT DIAM: 1.88 CM
BH CV ECHO MEAS - LVPWD: 0.94 CM
BH CV ECHO MEAS - MED PEAK E' VEL: 10.9 CM/SEC
BH CV ECHO MEAS - MV A DUR: 0.11 SEC
BH CV ECHO MEAS - MV A MAX VEL: 61.3 CM/SEC
BH CV ECHO MEAS - MV DEC SLOPE: 1066 CM/SEC2
BH CV ECHO MEAS - MV DEC TIME: 0.19 MSEC
BH CV ECHO MEAS - MV E MAX VEL: 93 CM/SEC
BH CV ECHO MEAS - MV E/A: 1.52
BH CV ECHO MEAS - MV MAX PG: 7.8 MMHG
BH CV ECHO MEAS - MV MEAN PG: 2.44 MMHG
BH CV ECHO MEAS - MV P1/2T: 39.1 MSEC
BH CV ECHO MEAS - MV V2 VTI: 29.5 CM
BH CV ECHO MEAS - MVA(P1/2T): 5.6 CM2
BH CV ECHO MEAS - MVA(VTI): 2.6 CM2
BH CV ECHO MEAS - PA ACC TIME: 0.11 SEC
BH CV ECHO MEAS - PA PR(ACCEL): 27.9 MMHG
BH CV ECHO MEAS - PA V2 MAX: 106.6 CM/SEC
BH CV ECHO MEAS - PULM A REVS DUR: 0.08 SEC
BH CV ECHO MEAS - PULM A REVS VEL: 35.1 CM/SEC
BH CV ECHO MEAS - PULM DIAS VEL: 63.4 CM/SEC
BH CV ECHO MEAS - PULM S/D: 0.97
BH CV ECHO MEAS - PULM SYS VEL: 61.3 CM/SEC
BH CV ECHO MEAS - QP/QS: 0.69
BH CV ECHO MEAS - RAP SYSTOLE: 3 MMHG
BH CV ECHO MEAS - RV MAX PG: 2.49 MMHG
BH CV ECHO MEAS - RV V1 MAX: 78.8 CM/SEC
BH CV ECHO MEAS - RV V1 VTI: 16.6 CM
BH CV ECHO MEAS - RVOT DIAM: 2.01 CM
BH CV ECHO MEAS - RVSP: 36 MMHG
BH CV ECHO MEAS - SI(MOD-SP2): 28.9 ML/M2
BH CV ECHO MEAS - SI(MOD-SP4): 30.3 ML/M2
BH CV ECHO MEAS - SUP REN AO DIAM: 1.8 CM
BH CV ECHO MEAS - SV(LVOT): 76.1 ML
BH CV ECHO MEAS - SV(MOD-SP2): 65 ML
BH CV ECHO MEAS - SV(MOD-SP4): 68 ML
BH CV ECHO MEAS - SV(RVOT): 52.7 ML
BH CV ECHO MEAS - TAPSE (>1.6): 2.5 CM
BH CV ECHO MEAS - TR MAX PG: 33.1 MMHG
BH CV ECHO MEAS - TR MAX VEL: 287.8 CM/SEC
BH CV ECHO MEASUREMENTS AVERAGE E/E' RATIO: 6.62
BH CV XLRA - RV BASE: 3.3 CM
BH CV XLRA - RV LENGTH: 6.5 CM
BH CV XLRA - RV MID: 3.2 CM
BH CV XLRA - TDI S': 21.8 CM/SEC
LEFT ATRIUM VOLUME INDEX: 20.7 ML/M2
MAXIMAL PREDICTED HEART RATE: 191 BPM
SINUS: 3.3 CM
STJ: 3 CM
STRESS TARGET HR: 162 BPM

## 2023-04-20 PROCEDURE — 93306 TTE W/DOPPLER COMPLETE: CPT

## 2023-04-20 PROCEDURE — 93306 TTE W/DOPPLER COMPLETE: CPT | Performed by: INTERNAL MEDICINE

## 2023-04-21 ENCOUNTER — TELEPHONE (OUTPATIENT)
Dept: CARDIOLOGY | Facility: CLINIC | Age: 28
End: 2023-04-21
Payer: COMMERCIAL

## 2023-04-21 NOTE — TELEPHONE ENCOUNTER
Reviewed results and recommendations with Renea Infante.  Patient verbalized understanding of results and recommendations.    Thank you,  Christina COVARRUBIAS RN  Triage Nurse [facility]

## 2023-04-21 NOTE — TELEPHONE ENCOUNTER
"Please inform patient her echo shows that her heart is strong and she has no significant valve disease. The lung pressure measured just one \"point\" above normal which could be related to high blood pressure and excess weight. This could also be due to possible underlying sleep apnea. If she has never been tested for sleep apnea, then I will plan to discuss possibly testing her in the future for that. At that time, ok to keep her appt in the fall with me and to call with any issues.   "

## 2023-04-21 NOTE — TELEPHONE ENCOUNTER
Called and left VM. Will continue to try to reach patient.     Zahra Hernandez RN  Triage [facility]

## 2024-06-04 ENCOUNTER — HOSPITAL ENCOUNTER (EMERGENCY)
Facility: HOSPITAL | Age: 29
Discharge: HOME | End: 2024-06-04
Attending: EMERGENCY MEDICINE | Admitting: EMERGENCY MEDICINE
Payer: COMMERCIAL

## 2024-06-04 VITALS
OXYGEN SATURATION: 98 % | HEART RATE: 68 BPM | RESPIRATION RATE: 18 BRPM | TEMPERATURE: 97.9 F | DIASTOLIC BLOOD PRESSURE: 102 MMHG | SYSTOLIC BLOOD PRESSURE: 165 MMHG

## 2024-06-04 DIAGNOSIS — R68.83 CHILLS: ICD-10-CM

## 2024-06-04 DIAGNOSIS — L50.9 URTICARIAL RASH: Primary | ICD-10-CM

## 2024-06-04 LAB
ALBUMIN SERPL-MCNC: 4.5 G/DL (ref 3.5–5.2)
ALBUMIN/GLOB SERPL: 1.7 G/DL
ALP SERPL-CCNC: 72 U/L (ref 39–117)
ALT SERPL W P-5'-P-CCNC: 19 U/L (ref 1–33)
ANION GAP SERPL CALCULATED.3IONS-SCNC: 14.8 MMOL/L (ref 5–15)
AST SERPL-CCNC: 17 U/L (ref 1–32)
B PARAPERT DNA SPEC QL NAA+PROBE: NOT DETECTED
B PERT DNA SPEC QL NAA+PROBE: NOT DETECTED
BASOPHILS # BLD AUTO: 0.05 10*3/MM3 (ref 0–0.2)
BASOPHILS NFR BLD AUTO: 0.5 % (ref 0–1.5)
BILIRUB SERPL-MCNC: 0.4 MG/DL (ref 0–1.2)
BUN SERPL-MCNC: 14 MG/DL (ref 6–20)
BUN/CREAT SERPL: 20.9 (ref 7–25)
C PNEUM DNA NPH QL NAA+NON-PROBE: NOT DETECTED
CALCIUM SPEC-SCNC: 9.3 MG/DL (ref 8.6–10.5)
CHLORIDE SERPL-SCNC: 104 MMOL/L (ref 98–107)
CO2 SERPL-SCNC: 21.2 MMOL/L (ref 22–29)
CREAT SERPL-MCNC: 0.67 MG/DL (ref 0.57–1)
DEPRECATED RDW RBC AUTO: 40.2 FL (ref 37–54)
EGFRCR SERPLBLD CKD-EPI 2021: 120 ML/MIN/1.73
EOSINOPHIL # BLD AUTO: 0.46 10*3/MM3 (ref 0–0.4)
EOSINOPHIL NFR BLD AUTO: 4.2 % (ref 0.3–6.2)
ERYTHROCYTE [DISTWIDTH] IN BLOOD BY AUTOMATED COUNT: 13 % (ref 12.3–15.4)
FLUAV SUBTYP SPEC NAA+PROBE: NOT DETECTED
FLUBV RNA ISLT QL NAA+PROBE: NOT DETECTED
GLOBULIN UR ELPH-MCNC: 2.6 GM/DL
GLUCOSE SERPL-MCNC: 99 MG/DL (ref 65–99)
HADV DNA SPEC NAA+PROBE: NOT DETECTED
HCOV 229E RNA SPEC QL NAA+PROBE: NOT DETECTED
HCOV HKU1 RNA SPEC QL NAA+PROBE: NOT DETECTED
HCOV NL63 RNA SPEC QL NAA+PROBE: NOT DETECTED
HCOV OC43 RNA SPEC QL NAA+PROBE: NOT DETECTED
HCT VFR BLD AUTO: 43.5 % (ref 34–46.6)
HGB BLD-MCNC: 14.4 G/DL (ref 12–15.9)
HMPV RNA NPH QL NAA+NON-PROBE: NOT DETECTED
HPIV1 RNA ISLT QL NAA+PROBE: NOT DETECTED
HPIV2 RNA SPEC QL NAA+PROBE: NOT DETECTED
HPIV3 RNA NPH QL NAA+PROBE: NOT DETECTED
HPIV4 P GENE NPH QL NAA+PROBE: NOT DETECTED
IMM GRANULOCYTES # BLD AUTO: 0.05 10*3/MM3 (ref 0–0.05)
IMM GRANULOCYTES NFR BLD AUTO: 0.5 % (ref 0–0.5)
LYMPHOCYTES # BLD AUTO: 2.73 10*3/MM3 (ref 0.7–3.1)
LYMPHOCYTES NFR BLD AUTO: 24.8 % (ref 19.6–45.3)
M PNEUMO IGG SER IA-ACNC: NOT DETECTED
MCH RBC QN AUTO: 28.5 PG (ref 26.6–33)
MCHC RBC AUTO-ENTMCNC: 33.1 G/DL (ref 31.5–35.7)
MCV RBC AUTO: 86 FL (ref 79–97)
MONOCYTES # BLD AUTO: 0.84 10*3/MM3 (ref 0.1–0.9)
MONOCYTES NFR BLD AUTO: 7.6 % (ref 5–12)
NEUTROPHILS NFR BLD AUTO: 6.87 10*3/MM3 (ref 1.7–7)
NEUTROPHILS NFR BLD AUTO: 62.4 % (ref 42.7–76)
NRBC BLD AUTO-RTO: 0 /100 WBC (ref 0–0.2)
PLATELET # BLD AUTO: 393 10*3/MM3 (ref 140–450)
PMV BLD AUTO: 9 FL (ref 6–12)
POTASSIUM SERPL-SCNC: 3.6 MMOL/L (ref 3.5–5.2)
PROCALCITONIN SERPL-MCNC: 0.05 NG/ML (ref 0–0.25)
PROT SERPL-MCNC: 7.1 G/DL (ref 6–8.5)
RBC # BLD AUTO: 5.06 10*6/MM3 (ref 3.77–5.28)
RHINOVIRUS RNA SPEC NAA+PROBE: NOT DETECTED
RSV RNA NPH QL NAA+NON-PROBE: NOT DETECTED
SARS-COV-2 RNA NPH QL NAA+NON-PROBE: NOT DETECTED
SODIUM SERPL-SCNC: 140 MMOL/L (ref 136–145)
WBC NRBC COR # BLD AUTO: 11 10*3/MM3 (ref 3.4–10.8)

## 2024-06-04 PROCEDURE — 85025 COMPLETE CBC W/AUTO DIFF WBC: CPT | Performed by: EMERGENCY MEDICINE

## 2024-06-04 PROCEDURE — 25010000002 METHYLPREDNISOLONE PER 125 MG: Performed by: EMERGENCY MEDICINE

## 2024-06-04 PROCEDURE — 96374 THER/PROPH/DIAG INJ IV PUSH: CPT

## 2024-06-04 PROCEDURE — 84145 PROCALCITONIN (PCT): CPT | Performed by: EMERGENCY MEDICINE

## 2024-06-04 PROCEDURE — 99283 EMERGENCY DEPT VISIT LOW MDM: CPT

## 2024-06-04 PROCEDURE — 96375 TX/PRO/DX INJ NEW DRUG ADDON: CPT

## 2024-06-04 PROCEDURE — 80053 COMPREHEN METABOLIC PANEL: CPT | Performed by: EMERGENCY MEDICINE

## 2024-06-04 PROCEDURE — 0202U NFCT DS 22 TRGT SARS-COV-2: CPT | Performed by: EMERGENCY MEDICINE

## 2024-06-04 PROCEDURE — 25010000002 DIPHENHYDRAMINE PER 50 MG: Performed by: EMERGENCY MEDICINE

## 2024-06-04 RX ORDER — METHYLPREDNISOLONE SODIUM SUCCINATE 125 MG/2ML
125 INJECTION, POWDER, LYOPHILIZED, FOR SOLUTION INTRAMUSCULAR; INTRAVENOUS ONCE
Status: COMPLETED | OUTPATIENT
Start: 2024-06-04 | End: 2024-06-04

## 2024-06-04 RX ORDER — SODIUM CHLORIDE 0.9 % (FLUSH) 0.9 %
10 SYRINGE (ML) INJECTION AS NEEDED
Status: DISCONTINUED | OUTPATIENT
Start: 2024-06-04 | End: 2024-06-04 | Stop reason: HOSPADM

## 2024-06-04 RX ORDER — HYDROXYZINE HYDROCHLORIDE 25 MG/1
25-50 TABLET, FILM COATED ORAL EVERY 6 HOURS PRN
Qty: 20 TABLET | Refills: 0 | Status: SHIPPED | OUTPATIENT
Start: 2024-06-04

## 2024-06-04 RX ORDER — FAMOTIDINE 10 MG/ML
20 INJECTION, SOLUTION INTRAVENOUS ONCE
Status: COMPLETED | OUTPATIENT
Start: 2024-06-04 | End: 2024-06-04

## 2024-06-04 RX ORDER — METHYLPREDNISOLONE 4 MG/1
TABLET ORAL
Qty: 21 TABLET | Refills: 1 | Status: SHIPPED | OUTPATIENT
Start: 2024-06-04

## 2024-06-04 RX ORDER — DIPHENHYDRAMINE HYDROCHLORIDE 50 MG/ML
50 INJECTION INTRAMUSCULAR; INTRAVENOUS ONCE
Status: COMPLETED | OUTPATIENT
Start: 2024-06-04 | End: 2024-06-04

## 2024-06-04 RX ADMIN — METHYLPREDNISOLONE SODIUM SUCCINATE 125 MG: 125 INJECTION, POWDER, FOR SOLUTION INTRAMUSCULAR; INTRAVENOUS at 08:26

## 2024-06-04 RX ADMIN — DIPHENHYDRAMINE HYDROCHLORIDE 50 MG: 50 INJECTION, SOLUTION INTRAMUSCULAR; INTRAVENOUS at 08:29

## 2024-06-04 RX ADMIN — FAMOTIDINE 20 MG: 10 INJECTION INTRAVENOUS at 08:32

## 2024-06-04 NOTE — ED PROVIDER NOTES
EMERGENCY DEPARTMENT ENCOUNTER  Room Number:    PCP: Provider, No Known  Independent Historians: Patient      HPI:  Chief Complaint: had concerns including Poison Ivy.  Chills, body aches    A complete HPI/ROS/PMH/PSH/SH/FH are unobtainable due to:   Chronic or social conditions impacting patient care (Social Determinants of Health):       Context: Renea Infante is a 29 y.o. female with a medical history of hypertension who presents to the ED c/o acute itchy rash, chills and bodyaches.  Patient thinks she may have had exposure to poison ivy.  They were cutting down trees and she avoided touching any of it but they did burn the leaves and made have had an inhalational exposure.  About 5 days ago she noticed red bumps and itching across much of the body.  She has been taking prednisone 20 mg daily as well as antihistamines but continues to have itching and rash.  She is also had chills and bodyaches.  Denies known fever.  Denies known ill exposure.  Does report some congestion but denies cough, chest pain or trouble breathing.  No dysuria.      Review of prior external notes (non-ED) -and- Review of prior external test results outside of this encounter:   I reviewed prior medical records including most recent cardiology office note from last year.  Patient seen for hypertension and palpitations.      Prescription drug monitoring program review:         PAST MEDICAL HISTORY  Active Ambulatory Problems     Diagnosis Date Noted    Cough 2017    Chest congestion 2017    Migraine with aura and without status migrainosus, not intractable 2023     Resolved Ambulatory Problems     Diagnosis Date Noted    No Resolved Ambulatory Problems     Past Medical History:   Diagnosis Date    Depression     Hidradenitis suppurativa     Hypertension     Hypoglycemia     Spina bifida occulta          PAST SURGICAL HISTORY  Past Surgical History:   Procedure Laterality Date     SECTION      CYST REMOVAL       RHINOPLASTY           FAMILY HISTORY  Family History   Problem Relation Age of Onset    Hypertension Mother     Hypertension Father     Heart disease Maternal Grandmother     Cancer Paternal Grandmother     Diabetes Paternal Grandmother          SOCIAL HISTORY  Social History     Socioeconomic History    Marital status: Single   Tobacco Use    Smoking status: Former    Smokeless tobacco: Never    Tobacco comments:     Caffeine - coffee- 3 days a week    Vaping Use    Vaping status: Former   Substance and Sexual Activity    Alcohol use: Not Currently    Drug use: No    Sexual activity: Defer         ALLERGIES  Penicillins      REVIEW OF SYSTEMS  Review of Systems   Constitutional:  Positive for chills. Negative for fever.   HENT:  Positive for congestion.    Respiratory:  Negative for shortness of breath.    Cardiovascular:  Negative for chest pain.   Skin:  Positive for rash.   All other systems reviewed and are negative.    Included in HPI  All systems reviewed and negative except for those discussed in HPI.      PHYSICAL EXAM    I have reviewed the triage vital signs and nursing notes.    ED Triage Vitals   Temp Pulse Resp BP SpO2   -- -- -- -- --      Temp src Heart Rate Source Patient Position BP Location FiO2 (%)   -- -- -- -- --       Physical Exam  GENERAL: Alert and pleasant female no obvious distress.  Triage vitals reviewed and are fairly unremarkable.  Temperature, heart rate and O2 sats benign  SKIN: Urticarial rash noted proximally more than distally.  Most of the rashes noted around the neck, trunk areas.  Less exposure to the arms and legs.  Exam is consistent with hives of unclear etiology.  HENT: Normocephalic, atraumatic  EYES: no scleral icterus  CV: regular rhythm, regular rate-no murmur  RESPIRATORY: normal effort, lungs clear-O2 sats upper 90s room air  ABDOMEN: soft, nontender, nondistended  MUSCULOSKELETAL: no deformity  NEURO: alert, moves all extremities, follows commands      LAB  RESULTS  Recent Results (from the past 24 hour(s))   Comprehensive Metabolic Panel    Collection Time: 06/04/24  8:21 AM    Specimen: Blood   Result Value Ref Range    Glucose 99 65 - 99 mg/dL    BUN 14 6 - 20 mg/dL    Creatinine 0.67 0.57 - 1.00 mg/dL    Sodium 140 136 - 145 mmol/L    Potassium 3.6 3.5 - 5.2 mmol/L    Chloride 104 98 - 107 mmol/L    CO2 21.2 (L) 22.0 - 29.0 mmol/L    Calcium 9.3 8.6 - 10.5 mg/dL    Total Protein 7.1 6.0 - 8.5 g/dL    Albumin 4.5 3.5 - 5.2 g/dL    ALT (SGPT) 19 1 - 33 U/L    AST (SGOT) 17 1 - 32 U/L    Alkaline Phosphatase 72 39 - 117 U/L    Total Bilirubin 0.4 0.0 - 1.2 mg/dL    Globulin 2.6 gm/dL    A/G Ratio 1.7 g/dL    BUN/Creatinine Ratio 20.9 7.0 - 25.0    Anion Gap 14.8 5.0 - 15.0 mmol/L    eGFR 120.0 >60.0 mL/min/1.73   Procalcitonin    Collection Time: 06/04/24  8:21 AM    Specimen: Blood   Result Value Ref Range    Procalcitonin 0.05 0.00 - 0.25 ng/mL   Respiratory Panel PCR w/COVID-19(SARS-CoV-2) [facility]/ISHAAN/SANJUANITA/PAD/COR/JESUS In-House, NP Swab in [facility], 2 HR TAT - Swab, Nasopharynx    Collection Time: 06/04/24  8:21 AM    Specimen: Nasopharynx; Swab   Result Value Ref Range    ADENOVIRUS, PCR Not Detected Not Detected    Coronavirus 229E Not Detected Not Detected    Coronavirus HKU1 Not Detected Not Detected    Coronavirus NL63 Not Detected Not Detected    Coronavirus OC43 Not Detected Not Detected    COVID19 Not Detected Not Detected - Ref. Range    Human Metapneumovirus Not Detected Not Detected    Human Rhinovirus/Enterovirus Not Detected Not Detected    Influenza A PCR Not Detected Not Detected    Influenza B PCR Not Detected Not Detected    Parainfluenza Virus 1 Not Detected Not Detected    Parainfluenza Virus 2 Not Detected Not Detected    Parainfluenza Virus 3 Not Detected Not Detected    Parainfluenza Virus 4 Not Detected Not Detected    RSV, PCR Not Detected Not Detected    Bordetella pertussis pcr Not Detected Not Detected    Bordetella parapertussis PCR Not  Detected Not Detected    Chlamydophila pneumoniae PCR Not Detected Not Detected    Mycoplasma pneumo by PCR Not Detected Not Detected   CBC Auto Differential    Collection Time: 06/04/24  8:21 AM    Specimen: Blood   Result Value Ref Range    WBC 11.00 (H) 3.40 - 10.80 10*3/mm3    RBC 5.06 3.77 - 5.28 10*6/mm3    Hemoglobin 14.4 12.0 - 15.9 g/dL    Hematocrit 43.5 34.0 - 46.6 %    MCV 86.0 79.0 - 97.0 fL    MCH 28.5 26.6 - 33.0 pg    MCHC 33.1 31.5 - 35.7 g/dL    RDW 13.0 12.3 - 15.4 %    RDW-SD 40.2 37.0 - 54.0 fl    MPV 9.0 6.0 - 12.0 fL    Platelets 393 140 - 450 10*3/mm3    Neutrophil % 62.4 42.7 - 76.0 %    Lymphocyte % 24.8 19.6 - 45.3 %    Monocyte % 7.6 5.0 - 12.0 %    Eosinophil % 4.2 0.3 - 6.2 %    Basophil % 0.5 0.0 - 1.5 %    Immature Grans % 0.5 0.0 - 0.5 %    Neutrophils, Absolute 6.87 1.70 - 7.00 10*3/mm3    Lymphocytes, Absolute 2.73 0.70 - 3.10 10*3/mm3    Monocytes, Absolute 0.84 0.10 - 0.90 10*3/mm3    Eosinophils, Absolute 0.46 (H) 0.00 - 0.40 10*3/mm3    Basophils, Absolute 0.05 0.00 - 0.20 10*3/mm3    Immature Grans, Absolute 0.05 0.00 - 0.05 10*3/mm3    nRBC 0.0 0.0 - 0.2 /100 WBC         RADIOLOGY  No Radiology Exams Resulted Within Past 24 Hours      MEDICATIONS GIVEN IN ER  Medications   sodium chloride 0.9 % flush 10 mL (has no administration in time range)   methylPREDNISolone sodium succinate (SOLU-Medrol) injection 125 mg (125 mg Intravenous Given 6/4/24 0826)   diphenhydrAMINE (BENADRYL) injection 50 mg (50 mg Intravenous Given 6/4/24 0829)   famotidine (PEPCID) injection 20 mg (20 mg Intravenous Given 6/4/24 0832)         ORDERS PLACED DURING THIS VISIT:  Orders Placed This Encounter   Procedures    Respiratory Panel PCR w/COVID-19(SARS-CoV-2) [facility]/ISHAAN/SANJUANITA/PAD/COR/JESUS In-House, NP Swab in UTM/VTM, 2 HR TAT - Swab, Nasopharynx    Comprehensive Metabolic Panel    Procalcitonin    CBC Auto Differential    Insert Peripheral IV    CBC & Differential         OUTPATIENT MEDICATION  MANAGEMENT:  Current Facility-Administered Medications Ordered in Epic   Medication Dose Route Frequency Provider Last Rate Last Admin    sodium chloride 0.9 % flush 10 mL  10 mL Intravenous PRN Jamey Javed MD         Current Outpatient Medications Ordered in Epic   Medication Sig Dispense Refill    methylPREDNISolone (MEDROL) 4 MG dose pack Take as directed on package 21 tablet 1    amLODIPine (NORVASC) 10 MG tablet Take 1 tablet by mouth Daily. 90 tablet 3    amphetamine-dextroamphetamine (Adderall) 20 MG tablet Take 1 tablet by mouth Every Morning. 30 tablet 0    amphetamine-dextroamphetamine (Adderall) 5 MG tablet Take 1 tablet by mouth every day at Noon 30 tablet 0    amphetamine-dextroamphetamine XR (Adderall XR) 20 MG 24 hr capsule Take 1 capsule by mouth Every Morning 30 capsule 0    amphetamine-dextroamphetamine XR (ADDERALL XR) 20 MG 24 hr capsule Take 1 capsule by mouth Every Morning 30 capsule 0    aspirin 81 MG EC tablet Take 1 tablet by mouth Daily. 90 tablet 0    hydrOXYzine (ATARAX) 25 MG tablet Take 1-2 tablets by mouth Every 6 (Six) Hours As Needed for Itching. 20 tablet 0    magnesium oxide (MAG-OX) 400 MG tablet Take 2 tablets by mouth every night at bedtime. 60 tablet 0    metoprolol tartrate (LOPRESSOR) 50 MG tablet Take 1 tablet by mouth 2 (Two) Times a Day. 180 tablet 3    Rimegepant Sulfate (Nurtec) 75 MG tablet dispersible tablet Place 1 tablet by mouth Every Other Day. 16 tablet 2    spironolactone (ALDACTONE) 100 MG tablet Take 1 tablet by mouth daily 90 tablet 3         PROCEDURES  Procedures            PROGRESS, DATA ANALYSIS, CONSULTS, AND MEDICAL DECISION MAKING  All labs have been independently interpreted by me.  All radiology studies have been reviewed by me. All EKG's have been independently viewed and interpreted by me.  Discussion below represents my analysis of pertinent findings related to patient's condition, differential diagnosis, treatment plan and final  disposition.    Differential diagnosis includes but is not limited to urticarial rash of unclear etiology.  Consider poison ivy exposure, likely inhalational.  Also patient has had chills and bodyaches.  Consider infectious causes including viral and bacterial illness of unclear source.  She is not having urinary or abdominal symptoms.  She is having some congestion and would think about viral URI as a possible cause.      ED Course as of 06/04/24 0940   Tue Jun 04, 2024   0933 Viral panel is negative for tested pathogens.    Procalcitonin is normal which would go against serious bacterial illness.  5 blood count slightly elevated although might be related to recent steroid usage.  Hemoglobin normal.    Chemistries benign without hepatic or renal failure. 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  Does report possible inhalational exposure to poison ivy.  Not improved despite use of steroids and antihistamines at home.    Treated in ED here with IV Solu-Medrol, Benadryl and Pepcid.  Symptoms markedly improved after IV medications.    Will discharge home on a course of Medrol dose pack, Atarax.  Suspect symptoms likely related to inhalational poison ivy with diffuse urticaria and chills.      DIAGNOSIS  Final diagnoses:   Urticarial rash   Chills         DISPOSITION  ED Disposition       ED Disposition   Discharge    Condition   Stable    Comment   --                Please note that portions of this document were completed with a voice recognition program.    Note Disclaimer: At [facility], we believe that sharing information builds trust and better relationships. You are receiving this note because you recently visited [facility]. It is possible you will see health information before a  provider has talked with you about it. This kind of information can be easy to misunderstand. To help you fully understand what it means for your health, we urge you to discuss this note with your provider.         Jamey Javed MD  06/04/24 0993